# Patient Record
Sex: FEMALE | Race: WHITE | URBAN - METROPOLITAN AREA
[De-identification: names, ages, dates, MRNs, and addresses within clinical notes are randomized per-mention and may not be internally consistent; named-entity substitution may affect disease eponyms.]

---

## 2017-09-06 ENCOUNTER — TRANSFERRED RECORDS (OUTPATIENT)
Dept: HEALTH INFORMATION MANAGEMENT | Facility: CLINIC | Age: 27
End: 2017-09-06

## 2017-09-19 ENCOUNTER — TRANSFERRED RECORDS (OUTPATIENT)
Dept: HEALTH INFORMATION MANAGEMENT | Facility: CLINIC | Age: 27
End: 2017-09-19

## 2017-09-20 ENCOUNTER — ANESTHESIA EVENT (OUTPATIENT)
Dept: SURGERY | Facility: CLINIC | Age: 27
End: 2017-09-20
Payer: COMMERCIAL

## 2017-09-20 ENCOUNTER — ANESTHESIA (OUTPATIENT)
Dept: SURGERY | Facility: CLINIC | Age: 27
End: 2017-09-20
Payer: COMMERCIAL

## 2017-09-20 ENCOUNTER — APPOINTMENT (OUTPATIENT)
Dept: INTERVENTIONAL RADIOLOGY/VASCULAR | Facility: CLINIC | Age: 27
End: 2017-09-20
Attending: EMERGENCY MEDICINE
Payer: COMMERCIAL

## 2017-09-20 ENCOUNTER — HOSPITAL ENCOUNTER (INPATIENT)
Facility: CLINIC | Age: 27
LOS: 2 days | Discharge: HOME | End: 2017-09-23
Attending: EMERGENCY MEDICINE | Admitting: OBSTETRICS & GYNECOLOGY
Payer: COMMERCIAL

## 2017-09-20 DIAGNOSIS — N99.820 POSTOPERATIVE HEMORRHAGE OF VAGINA FOLLOWING GENITOURINARY PROCEDURE: ICD-10-CM

## 2017-09-20 PROBLEM — Z98.890 STATUS POST EMBOLIZATION OF UTERINE ARTERY: Status: ACTIVE | Noted: 2017-09-20

## 2017-09-20 LAB
ABO + RH BLD: ABNORMAL
ABO + RH BLD: ABNORMAL
ALBUMIN SERPL-MCNC: 2 G/DL (ref 3.4–5)
ALP SERPL-CCNC: 29 U/L (ref 40–150)
ALT SERPL W P-5'-P-CCNC: 11 U/L (ref 0–50)
ANION GAP SERPL CALCULATED.3IONS-SCNC: 11 MMOL/L (ref 3–14)
APTT PPP: 27 SEC (ref 22–37)
APTT PPP: 30 SEC (ref 22–37)
AST SERPL W P-5'-P-CCNC: 12 U/L (ref 0–45)
BASOPHILS # BLD AUTO: 0 10E9/L (ref 0–0.2)
BASOPHILS NFR BLD AUTO: 0.1 %
BILIRUB SERPL-MCNC: 0.3 MG/DL (ref 0.2–1.3)
BLD GP AB INVEST PLASRBC-IMP: ABNORMAL
BLD GP AB SCN SERPL QL: ABNORMAL
BLD PROD TYP BPU: ABNORMAL
BLD PROD TYP BPU: NORMAL
BLD UNIT ID BPU: 0
BLOOD BANK CMNT PATIENT-IMP: ABNORMAL
BLOOD BANK CMNT PATIENT-IMP: ABNORMAL
BLOOD BANK CMNT PATIENT-IMP: NORMAL
BLOOD PRODUCT CODE: NORMAL
BPU ID: NORMAL
BUN SERPL-MCNC: 4 MG/DL (ref 7–30)
CALCIUM SERPL-MCNC: 6 MG/DL (ref 8.5–10.1)
CHLORIDE SERPL-SCNC: 119 MMOL/L (ref 94–109)
CO2 SERPL-SCNC: 15 MMOL/L (ref 20–32)
CREAT SERPL-MCNC: 0.37 MG/DL (ref 0.52–1.04)
DIFFERENTIAL METHOD BLD: ABNORMAL
EOSINOPHIL # BLD AUTO: 0 10E9/L (ref 0–0.7)
EOSINOPHIL NFR BLD AUTO: 0.1 %
ERYTHROCYTE [DISTWIDTH] IN BLOOD BY AUTOMATED COUNT: 12.9 % (ref 10–15)
ERYTHROCYTE [DISTWIDTH] IN BLOOD BY AUTOMATED COUNT: 13 % (ref 10–15)
ERYTHROCYTE [DISTWIDTH] IN BLOOD BY AUTOMATED COUNT: 13.5 % (ref 10–15)
ERYTHROCYTE [DISTWIDTH] IN BLOOD BY AUTOMATED COUNT: 13.8 % (ref 10–15)
FIBRINOGEN PPP-MCNC: 156 MG/DL (ref 200–420)
GFR SERPL CREATININE-BSD FRML MDRD: >90 ML/MIN/1.7M2
GLUCOSE SERPL-MCNC: 133 MG/DL (ref 70–99)
HCT VFR BLD AUTO: 17.3 % (ref 35–47)
HCT VFR BLD AUTO: 22.5 % (ref 35–47)
HCT VFR BLD AUTO: 24.1 % (ref 35–47)
HCT VFR BLD AUTO: 26.6 % (ref 35–47)
HGB BLD-MCNC: 5.8 G/DL (ref 11.7–15.7)
HGB BLD-MCNC: 7.7 G/DL (ref 11.7–15.7)
HGB BLD-MCNC: 8 G/DL (ref 11.7–15.7)
HGB BLD-MCNC: 9.2 G/DL (ref 11.7–15.7)
IMM GRANULOCYTES # BLD: 0.1 10E9/L (ref 0–0.4)
IMM GRANULOCYTES NFR BLD: 0.4 %
INR PPP: 1.08 (ref 0.86–1.14)
INR PPP: 1.18 (ref 0.86–1.14)
INR PPP: 1.44 (ref 0.86–1.14)
LYMPHOCYTES # BLD AUTO: 1.1 10E9/L (ref 0.8–5.3)
LYMPHOCYTES NFR BLD AUTO: 6.6 %
MCH RBC QN AUTO: 30.5 PG (ref 26.5–33)
MCH RBC QN AUTO: 30.5 PG (ref 26.5–33)
MCH RBC QN AUTO: 31 PG (ref 26.5–33)
MCH RBC QN AUTO: 31.3 PG (ref 26.5–33)
MCHC RBC AUTO-ENTMCNC: 33.2 G/DL (ref 31.5–36.5)
MCHC RBC AUTO-ENTMCNC: 33.5 G/DL (ref 31.5–36.5)
MCHC RBC AUTO-ENTMCNC: 34.2 G/DL (ref 31.5–36.5)
MCHC RBC AUTO-ENTMCNC: 34.6 G/DL (ref 31.5–36.5)
MCV RBC AUTO: 91 FL (ref 78–100)
MCV RBC AUTO: 92 FL (ref 78–100)
MONOCYTES # BLD AUTO: 0.9 10E9/L (ref 0–1.3)
MONOCYTES NFR BLD AUTO: 5.2 %
NEUTROPHILS # BLD AUTO: 14.6 10E9/L (ref 1.6–8.3)
NEUTROPHILS NFR BLD AUTO: 87.6 %
NRBC # BLD AUTO: 0 10*3/UL
NRBC BLD AUTO-RTO: 0 /100
NUM BPU REQUESTED: 4
PLATELET # BLD AUTO: 144 10E9/L (ref 150–450)
PLATELET # BLD AUTO: 160 10E9/L (ref 150–450)
PLATELET # BLD AUTO: 187 10E9/L (ref 150–450)
PLATELET # BLD AUTO: 201 10E9/L (ref 150–450)
POTASSIUM SERPL-SCNC: 3.8 MMOL/L (ref 3.4–5.3)
PROT SERPL-MCNC: 3.7 G/DL (ref 6.8–8.8)
RBC # BLD AUTO: 1.9 10E12/L (ref 3.8–5.2)
RBC # BLD AUTO: 2.48 10E12/L (ref 3.8–5.2)
RBC # BLD AUTO: 2.62 10E12/L (ref 3.8–5.2)
RBC # BLD AUTO: 2.94 10E12/L (ref 3.8–5.2)
SODIUM SERPL-SCNC: 145 MMOL/L (ref 133–144)
SPECIMEN EXP DATE BLD: ABNORMAL
TRANSFUSION STATUS PATIENT QL: NORMAL
WBC # BLD AUTO: 12.9 10E9/L (ref 4–11)
WBC # BLD AUTO: 13.3 10E9/L (ref 4–11)
WBC # BLD AUTO: 14.4 10E9/L (ref 4–11)
WBC # BLD AUTO: 16.6 10E9/L (ref 4–11)

## 2017-09-20 PROCEDURE — 85610 PROTHROMBIN TIME: CPT | Performed by: OBSTETRICS & GYNECOLOGY

## 2017-09-20 PROCEDURE — 25000128 H RX IP 250 OP 636: Performed by: RADIOLOGY

## 2017-09-20 PROCEDURE — 75736 ARTERY X-RAYS PELVIS: CPT

## 2017-09-20 PROCEDURE — 85461 HEMOGLOBIN FETAL: CPT | Performed by: OBSTETRICS & GYNECOLOGY

## 2017-09-20 PROCEDURE — C1769 GUIDE WIRE: HCPCS

## 2017-09-20 PROCEDURE — 37000008 ZZH ANESTHESIA TECHNICAL FEE, 1ST 30 MIN

## 2017-09-20 PROCEDURE — C1887 CATHETER, GUIDING: HCPCS

## 2017-09-20 PROCEDURE — 36415 COLL VENOUS BLD VENIPUNCTURE: CPT | Performed by: OBSTETRICS & GYNECOLOGY

## 2017-09-20 PROCEDURE — 40000344 ZZHCL STATISTIC THAWING COMPONENT: Performed by: EMERGENCY MEDICINE

## 2017-09-20 PROCEDURE — 25000128 H RX IP 250 OP 636: Performed by: EMERGENCY MEDICINE

## 2017-09-20 PROCEDURE — C1760 CLOSURE DEV, VASC: HCPCS

## 2017-09-20 PROCEDURE — 25000128 H RX IP 250 OP 636: Performed by: ANESTHESIOLOGY

## 2017-09-20 PROCEDURE — 99285 EMERGENCY DEPT VISIT HI MDM: CPT | Mod: 25 | Performed by: EMERGENCY MEDICINE

## 2017-09-20 PROCEDURE — 25000125 ZZHC RX 250: Performed by: NURSE ANESTHETIST, CERTIFIED REGISTERED

## 2017-09-20 PROCEDURE — 85730 THROMBOPLASTIN TIME PARTIAL: CPT | Performed by: OBSTETRICS & GYNECOLOGY

## 2017-09-20 PROCEDURE — 36247 INS CATH ABD/L-EXT ART 3RD: CPT

## 2017-09-20 PROCEDURE — 85027 COMPLETE CBC AUTOMATED: CPT | Performed by: OBSTETRICS & GYNECOLOGY

## 2017-09-20 PROCEDURE — 85025 COMPLETE CBC W/AUTO DIFF WBC: CPT | Performed by: EMERGENCY MEDICINE

## 2017-09-20 PROCEDURE — 85610 PROTHROMBIN TIME: CPT | Performed by: EMERGENCY MEDICINE

## 2017-09-20 PROCEDURE — 86900 BLOOD TYPING SEROLOGIC ABO: CPT | Performed by: OBSTETRICS & GYNECOLOGY

## 2017-09-20 PROCEDURE — 86900 BLOOD TYPING SEROLOGIC ABO: CPT | Performed by: EMERGENCY MEDICINE

## 2017-09-20 PROCEDURE — 71000014 ZZH RECOVERY PHASE 1 LEVEL 2 FIRST HR

## 2017-09-20 PROCEDURE — 86923 COMPATIBILITY TEST ELECTRIC: CPT | Performed by: EMERGENCY MEDICINE

## 2017-09-20 PROCEDURE — 04LF3DU OCCLUSION OF LEFT UTERINE ARTERY WITH INTRALUMINAL DEVICE, PERCUTANEOUS APPROACH: ICD-10-PCS | Performed by: RADIOLOGY

## 2017-09-20 PROCEDURE — 25000128 H RX IP 250 OP 636: Performed by: NURSE ANESTHETIST, CERTIFIED REGISTERED

## 2017-09-20 PROCEDURE — G0378 HOSPITAL OBSERVATION PER HR: HCPCS

## 2017-09-20 PROCEDURE — 25000565 ZZH ISOFLURANE, EA 15 MIN

## 2017-09-20 PROCEDURE — 25000125 ZZHC RX 250: Performed by: RADIOLOGY

## 2017-09-20 PROCEDURE — 96374 THER/PROPH/DIAG INJ IV PUSH: CPT | Performed by: EMERGENCY MEDICINE

## 2017-09-20 PROCEDURE — P9016 RBC LEUKOCYTES REDUCED: HCPCS | Performed by: EMERGENCY MEDICINE

## 2017-09-20 PROCEDURE — 86901 BLOOD TYPING SEROLOGIC RH(D): CPT | Performed by: EMERGENCY MEDICINE

## 2017-09-20 PROCEDURE — 37000009 ZZH ANESTHESIA TECHNICAL FEE, EACH ADDTL 15 MIN

## 2017-09-20 PROCEDURE — 25000128 H RX IP 250 OP 636: Performed by: OBSTETRICS & GYNECOLOGY

## 2017-09-20 PROCEDURE — 27210902 ZZH KIT CR4

## 2017-09-20 PROCEDURE — 86870 RBC ANTIBODY IDENTIFICATION: CPT | Performed by: EMERGENCY MEDICINE

## 2017-09-20 PROCEDURE — 85384 FIBRINOGEN ACTIVITY: CPT | Performed by: OBSTETRICS & GYNECOLOGY

## 2017-09-20 PROCEDURE — P9059 PLASMA, FRZ BETWEEN 8-24HOUR: HCPCS | Performed by: EMERGENCY MEDICINE

## 2017-09-20 PROCEDURE — 25000132 ZZH RX MED GY IP 250 OP 250 PS 637: Performed by: STUDENT IN AN ORGANIZED HEALTH CARE EDUCATION/TRAINING PROGRAM

## 2017-09-20 PROCEDURE — 85027 COMPLETE CBC AUTOMATED: CPT | Performed by: EMERGENCY MEDICINE

## 2017-09-20 PROCEDURE — 37244 VASC EMBOLIZE/OCCLUDE BLEED: CPT

## 2017-09-20 PROCEDURE — 27810149 ZZH COIL/EMBOLIC DEVICE CR12

## 2017-09-20 PROCEDURE — 99285 EMERGENCY DEPT VISIT HI MDM: CPT | Mod: Z6 | Performed by: EMERGENCY MEDICINE

## 2017-09-20 PROCEDURE — 80053 COMPREHEN METABOLIC PANEL: CPT | Performed by: OBSTETRICS & GYNECOLOGY

## 2017-09-20 PROCEDURE — 86850 RBC ANTIBODY SCREEN: CPT | Performed by: EMERGENCY MEDICINE

## 2017-09-20 PROCEDURE — P9041 ALBUMIN (HUMAN),5%, 50ML: HCPCS | Performed by: NURSE ANESTHETIST, CERTIFIED REGISTERED

## 2017-09-20 PROCEDURE — 71000015 ZZH RECOVERY PHASE 1 LEVEL 2 EA ADDTL HR

## 2017-09-20 PROCEDURE — 86901 BLOOD TYPING SEROLOGIC RH(D): CPT | Performed by: OBSTETRICS & GYNECOLOGY

## 2017-09-20 PROCEDURE — 27211034

## 2017-09-20 RX ORDER — HYDROMORPHONE HYDROCHLORIDE 1 MG/ML
.3-.5 INJECTION, SOLUTION INTRAMUSCULAR; INTRAVENOUS; SUBCUTANEOUS EVERY 5 MIN PRN
Status: DISCONTINUED | OUTPATIENT
Start: 2017-09-20 | End: 2017-09-20 | Stop reason: CLARIF

## 2017-09-20 RX ORDER — LIDOCAINE 40 MG/G
CREAM TOPICAL
Status: DISCONTINUED | OUTPATIENT
Start: 2017-09-20 | End: 2017-09-20

## 2017-09-20 RX ORDER — DIPHENHYDRAMINE HYDROCHLORIDE 50 MG/ML
25 INJECTION INTRAMUSCULAR; INTRAVENOUS EVERY 6 HOURS PRN
Status: DISCONTINUED | OUTPATIENT
Start: 2017-09-20 | End: 2017-09-23 | Stop reason: HOSPADM

## 2017-09-20 RX ORDER — SODIUM CHLORIDE 9 MG/ML
INJECTION, SOLUTION INTRAVENOUS CONTINUOUS PRN
Status: DISCONTINUED | OUTPATIENT
Start: 2017-09-20 | End: 2017-09-20

## 2017-09-20 RX ORDER — ONDANSETRON 4 MG/1
4 TABLET, ORALLY DISINTEGRATING ORAL EVERY 6 HOURS PRN
Status: DISCONTINUED | OUTPATIENT
Start: 2017-09-20 | End: 2017-09-23 | Stop reason: HOSPADM

## 2017-09-20 RX ORDER — ONDANSETRON 4 MG/1
4 TABLET, ORALLY DISINTEGRATING ORAL EVERY 30 MIN PRN
Status: DISCONTINUED | OUTPATIENT
Start: 2017-09-20 | End: 2017-09-22

## 2017-09-20 RX ORDER — NITROGLYCERIN 5 MG/ML
100-1000 VIAL (ML) INTRAVENOUS ONCE
Status: COMPLETED | OUTPATIENT
Start: 2017-09-20 | End: 2017-09-20

## 2017-09-20 RX ORDER — SODIUM CHLORIDE 9 MG/ML
INJECTION, SOLUTION INTRAVENOUS CONTINUOUS
Status: DISCONTINUED | OUTPATIENT
Start: 2017-09-20 | End: 2017-09-20

## 2017-09-20 RX ORDER — EPHEDRINE SULFATE 50 MG/ML
INJECTION, SOLUTION INTRAMUSCULAR; INTRAVENOUS; SUBCUTANEOUS PRN
Status: DISCONTINUED | OUTPATIENT
Start: 2017-09-20 | End: 2017-09-20

## 2017-09-20 RX ORDER — LIDOCAINE HYDROCHLORIDE 20 MG/ML
INJECTION, SOLUTION INFILTRATION; PERINEURAL PRN
Status: DISCONTINUED | OUTPATIENT
Start: 2017-09-20 | End: 2017-09-20

## 2017-09-20 RX ORDER — LIDOCAINE HYDROCHLORIDE 10 MG/ML
1-30 INJECTION, SOLUTION EPIDURAL; INFILTRATION; INTRACAUDAL; PERINEURAL
Status: COMPLETED | OUTPATIENT
Start: 2017-09-20 | End: 2017-09-20

## 2017-09-20 RX ORDER — HYDROCODONE BITARTRATE AND ACETAMINOPHEN 5; 325 MG/1; MG/1
1-2 TABLET ORAL EVERY 6 HOURS PRN
Status: DISCONTINUED | OUTPATIENT
Start: 2017-09-20 | End: 2017-09-21

## 2017-09-20 RX ORDER — PROPOFOL 10 MG/ML
INJECTION, EMULSION INTRAVENOUS PRN
Status: DISCONTINUED | OUTPATIENT
Start: 2017-09-20 | End: 2017-09-20

## 2017-09-20 RX ORDER — POLYETHYLENE GLYCOL 3350 17 G/17G
17 POWDER, FOR SOLUTION ORAL DAILY
Status: DISCONTINUED | OUTPATIENT
Start: 2017-09-20 | End: 2017-09-20

## 2017-09-20 RX ORDER — CALCIUM CARBONATE 500 MG/1
1000 TABLET, CHEWABLE ORAL 3 TIMES DAILY PRN
Status: DISCONTINUED | OUTPATIENT
Start: 2017-09-20 | End: 2017-09-20

## 2017-09-20 RX ORDER — ONDANSETRON 2 MG/ML
4 INJECTION INTRAMUSCULAR; INTRAVENOUS EVERY 30 MIN PRN
Status: DISCONTINUED | OUTPATIENT
Start: 2017-09-20 | End: 2017-09-22

## 2017-09-20 RX ORDER — CEFAZOLIN SODIUM 2 G/100ML
2 INJECTION, SOLUTION INTRAVENOUS
Status: DISCONTINUED | OUTPATIENT
Start: 2017-09-20 | End: 2017-09-20

## 2017-09-20 RX ORDER — FENTANYL CITRATE 50 UG/ML
INJECTION, SOLUTION INTRAMUSCULAR; INTRAVENOUS PRN
Status: DISCONTINUED | OUTPATIENT
Start: 2017-09-20 | End: 2017-09-20

## 2017-09-20 RX ORDER — DEXAMETHASONE SODIUM PHOSPHATE 4 MG/ML
INJECTION, SOLUTION INTRA-ARTICULAR; INTRALESIONAL; INTRAMUSCULAR; INTRAVENOUS; SOFT TISSUE PRN
Status: DISCONTINUED | OUTPATIENT
Start: 2017-09-20 | End: 2017-09-20

## 2017-09-20 RX ORDER — ONDANSETRON 2 MG/ML
INJECTION INTRAMUSCULAR; INTRAVENOUS PRN
Status: DISCONTINUED | OUTPATIENT
Start: 2017-09-20 | End: 2017-09-20

## 2017-09-20 RX ORDER — ACETAMINOPHEN 325 MG/1
650 TABLET ORAL EVERY 4 HOURS PRN
Status: DISCONTINUED | OUTPATIENT
Start: 2017-09-20 | End: 2017-09-22

## 2017-09-20 RX ORDER — ALBUMIN, HUMAN INJ 5% 5 %
SOLUTION INTRAVENOUS CONTINUOUS PRN
Status: DISCONTINUED | OUTPATIENT
Start: 2017-09-20 | End: 2017-09-20

## 2017-09-20 RX ORDER — FENTANYL CITRATE 50 UG/ML
25-50 INJECTION, SOLUTION INTRAMUSCULAR; INTRAVENOUS
Status: DISCONTINUED | OUTPATIENT
Start: 2017-09-20 | End: 2017-09-20 | Stop reason: CLARIF

## 2017-09-20 RX ORDER — NALOXONE HYDROCHLORIDE 0.4 MG/ML
.1-.4 INJECTION, SOLUTION INTRAMUSCULAR; INTRAVENOUS; SUBCUTANEOUS
Status: DISCONTINUED | OUTPATIENT
Start: 2017-09-20 | End: 2017-09-23 | Stop reason: HOSPADM

## 2017-09-20 RX ORDER — IBUPROFEN 600 MG/1
600 TABLET, FILM COATED ORAL EVERY 6 HOURS PRN
Status: DISCONTINUED | OUTPATIENT
Start: 2017-09-20 | End: 2017-09-23 | Stop reason: HOSPADM

## 2017-09-20 RX ORDER — DIPHENHYDRAMINE HCL 25 MG
25 CAPSULE ORAL EVERY 6 HOURS PRN
Status: DISCONTINUED | OUTPATIENT
Start: 2017-09-20 | End: 2017-09-23 | Stop reason: HOSPADM

## 2017-09-20 RX ORDER — FENTANYL CITRATE 50 UG/ML
50-100 INJECTION, SOLUTION INTRAMUSCULAR; INTRAVENOUS
Status: DISCONTINUED | OUTPATIENT
Start: 2017-09-20 | End: 2017-09-20

## 2017-09-20 RX ORDER — CEFAZOLIN SODIUM 1 G/3ML
INJECTION, POWDER, FOR SOLUTION INTRAMUSCULAR; INTRAVENOUS PRN
Status: DISCONTINUED | OUTPATIENT
Start: 2017-09-20 | End: 2017-09-20

## 2017-09-20 RX ORDER — SODIUM CHLORIDE, SODIUM LACTATE, POTASSIUM CHLORIDE, CALCIUM CHLORIDE 600; 310; 30; 20 MG/100ML; MG/100ML; MG/100ML; MG/100ML
INJECTION, SOLUTION INTRAVENOUS CONTINUOUS
Status: DISCONTINUED | OUTPATIENT
Start: 2017-09-20 | End: 2017-09-23 | Stop reason: HOSPADM

## 2017-09-20 RX ORDER — ONDANSETRON 4 MG/1
4 TABLET, FILM COATED ORAL EVERY 6 HOURS PRN
Status: DISCONTINUED | OUTPATIENT
Start: 2017-09-20 | End: 2017-09-20

## 2017-09-20 RX ORDER — SODIUM CHLORIDE, SODIUM LACTATE, POTASSIUM CHLORIDE, CALCIUM CHLORIDE 600; 310; 30; 20 MG/100ML; MG/100ML; MG/100ML; MG/100ML
INJECTION, SOLUTION INTRAVENOUS CONTINUOUS PRN
Status: DISCONTINUED | OUTPATIENT
Start: 2017-09-20 | End: 2017-09-20

## 2017-09-20 RX ORDER — IODIXANOL 320 MG/ML
1-150 INJECTION, SOLUTION INTRAVASCULAR ONCE
Status: COMPLETED | OUTPATIENT
Start: 2017-09-20 | End: 2017-09-20

## 2017-09-20 RX ADMIN — HYDROMORPHONE HYDROCHLORIDE 0.5 MG: 1 INJECTION, SOLUTION INTRAMUSCULAR; INTRAVENOUS; SUBCUTANEOUS at 22:04

## 2017-09-20 RX ADMIN — PHENYLEPHRINE HYDROCHLORIDE 0.4 MCG/KG/MIN: 10 INJECTION, SOLUTION INTRAMUSCULAR; INTRAVENOUS; SUBCUTANEOUS at 17:04

## 2017-09-20 RX ADMIN — SODIUM CHLORIDE 1000 ML: 9 INJECTION, SOLUTION INTRAVENOUS at 15:22

## 2017-09-20 RX ADMIN — PHENYLEPHRINE HYDROCHLORIDE 50 MCG: 10 INJECTION, SOLUTION INTRAMUSCULAR; INTRAVENOUS; SUBCUTANEOUS at 19:13

## 2017-09-20 RX ADMIN — PROPOFOL 100 MG: 10 INJECTION, EMULSION INTRAVENOUS at 16:22

## 2017-09-20 RX ADMIN — Medication 40 MG: at 16:35

## 2017-09-20 RX ADMIN — Medication 10 MG: at 16:22

## 2017-09-20 RX ADMIN — FENTANYL CITRATE 25 MCG: 50 INJECTION, SOLUTION INTRAMUSCULAR; INTRAVENOUS at 21:16

## 2017-09-20 RX ADMIN — HYDROMORPHONE HYDROCHLORIDE: 10 INJECTION, SOLUTION INTRAMUSCULAR; INTRAVENOUS; SUBCUTANEOUS at 20:58

## 2017-09-20 RX ADMIN — Medication 5 MG: at 18:30

## 2017-09-20 RX ADMIN — PHENYLEPHRINE HYDROCHLORIDE: 10 INJECTION, SOLUTION INTRAMUSCULAR; INTRAVENOUS; SUBCUTANEOUS at 18:06

## 2017-09-20 RX ADMIN — PROPOFOL 30 MG: 10 INJECTION, EMULSION INTRAVENOUS at 16:34

## 2017-09-20 RX ADMIN — IODIXANOL 82 ML: 320 INJECTION, SOLUTION INTRAVASCULAR at 19:33

## 2017-09-20 RX ADMIN — PHENYLEPHRINE HYDROCHLORIDE 0.2 MCG/KG/MIN: 10 INJECTION, SOLUTION INTRAMUSCULAR; INTRAVENOUS; SUBCUTANEOUS at 16:38

## 2017-09-20 RX ADMIN — PHENYLEPHRINE HYDROCHLORIDE 100 MCG: 10 INJECTION, SOLUTION INTRAMUSCULAR; INTRAVENOUS; SUBCUTANEOUS at 16:42

## 2017-09-20 RX ADMIN — PHENYLEPHRINE HYDROCHLORIDE 50 MCG: 10 INJECTION, SOLUTION INTRAMUSCULAR; INTRAVENOUS; SUBCUTANEOUS at 19:39

## 2017-09-20 RX ADMIN — SODIUM CHLORIDE: 9 INJECTION, SOLUTION INTRAVENOUS at 17:56

## 2017-09-20 RX ADMIN — DEXAMETHASONE SODIUM PHOSPHATE 4 MG: 4 INJECTION, SOLUTION INTRAMUSCULAR; INTRAVENOUS at 16:34

## 2017-09-20 RX ADMIN — ALBUMIN (HUMAN): 12.5 SOLUTION INTRAVENOUS at 16:56

## 2017-09-20 RX ADMIN — LIDOCAINE HYDROCHLORIDE 2 ML: 10 INJECTION, SOLUTION EPIDURAL; INFILTRATION; INTRACAUDAL; PERINEURAL at 19:33

## 2017-09-20 RX ADMIN — ALBUMIN (HUMAN): 12.5 SOLUTION INTRAVENOUS at 16:45

## 2017-09-20 RX ADMIN — FENTANYL CITRATE 50 MCG: 50 INJECTION, SOLUTION INTRAMUSCULAR; INTRAVENOUS at 20:48

## 2017-09-20 RX ADMIN — HYDROMORPHONE HYDROCHLORIDE 0.3 MG: 1 INJECTION, SOLUTION INTRAMUSCULAR; INTRAVENOUS; SUBCUTANEOUS at 21:31

## 2017-09-20 RX ADMIN — PHENYLEPHRINE HYDROCHLORIDE 50 MCG: 10 INJECTION, SOLUTION INTRAMUSCULAR; INTRAVENOUS; SUBCUTANEOUS at 17:27

## 2017-09-20 RX ADMIN — Medication 5 MG: at 17:02

## 2017-09-20 RX ADMIN — PHENYLEPHRINE HYDROCHLORIDE 100 MCG: 10 INJECTION, SOLUTION INTRAMUSCULAR; INTRAVENOUS; SUBCUTANEOUS at 16:57

## 2017-09-20 RX ADMIN — FENTANYL CITRATE 25 MCG: 50 INJECTION, SOLUTION INTRAMUSCULAR; INTRAVENOUS at 19:02

## 2017-09-20 RX ADMIN — Medication 270 MCG: at 19:34

## 2017-09-20 RX ADMIN — FENTANYL CITRATE 50 MCG: 50 INJECTION, SOLUTION INTRAMUSCULAR; INTRAVENOUS at 21:01

## 2017-09-20 RX ADMIN — FENTANYL CITRATE 50 MCG: 50 INJECTION, SOLUTION INTRAMUSCULAR; INTRAVENOUS at 15:34

## 2017-09-20 RX ADMIN — MIDAZOLAM HYDROCHLORIDE 2 MG: 1 INJECTION, SOLUTION INTRAMUSCULAR; INTRAVENOUS at 16:10

## 2017-09-20 RX ADMIN — FENTANYL CITRATE 25 MCG: 50 INJECTION, SOLUTION INTRAMUSCULAR; INTRAVENOUS at 20:27

## 2017-09-20 RX ADMIN — CEFAZOLIN 1 G: 1 INJECTION, POWDER, FOR SOLUTION INTRAMUSCULAR; INTRAVENOUS at 18:45

## 2017-09-20 RX ADMIN — FENTANYL CITRATE 50 MCG: 50 INJECTION, SOLUTION INTRAMUSCULAR; INTRAVENOUS at 20:35

## 2017-09-20 RX ADMIN — PHENYLEPHRINE HYDROCHLORIDE 100 MCG: 10 INJECTION, SOLUTION INTRAMUSCULAR; INTRAVENOUS; SUBCUTANEOUS at 18:30

## 2017-09-20 RX ADMIN — CEFAZOLIN 2 G: 1 INJECTION, POWDER, FOR SOLUTION INTRAMUSCULAR; INTRAVENOUS at 16:45

## 2017-09-20 RX ADMIN — ACETAMINOPHEN 650 MG: 325 TABLET, FILM COATED ORAL at 21:25

## 2017-09-20 RX ADMIN — PHENYLEPHRINE HYDROCHLORIDE 200 MCG: 10 INJECTION, SOLUTION INTRAMUSCULAR; INTRAVENOUS; SUBCUTANEOUS at 16:28

## 2017-09-20 RX ADMIN — Medication 5 MG: at 17:24

## 2017-09-20 RX ADMIN — ONDANSETRON 4 MG: 2 INJECTION INTRAMUSCULAR; INTRAVENOUS at 19:29

## 2017-09-20 RX ADMIN — FENTANYL CITRATE 100 MCG: 50 INJECTION, SOLUTION INTRAMUSCULAR; INTRAVENOUS at 16:22

## 2017-09-20 RX ADMIN — PROPOFOL 30 MG: 10 INJECTION, EMULSION INTRAVENOUS at 19:00

## 2017-09-20 RX ADMIN — SODIUM CHLORIDE, POTASSIUM CHLORIDE, SODIUM LACTATE AND CALCIUM CHLORIDE: 600; 310; 30; 20 INJECTION, SOLUTION INTRAVENOUS at 16:10

## 2017-09-20 RX ADMIN — FENTANYL CITRATE 25 MCG: 50 INJECTION, SOLUTION INTRAMUSCULAR; INTRAVENOUS at 17:15

## 2017-09-20 RX ADMIN — LIDOCAINE HYDROCHLORIDE 60 MG: 20 INJECTION, SOLUTION INFILTRATION; PERINEURAL at 16:22

## 2017-09-20 RX ADMIN — PHENYLEPHRINE HYDROCHLORIDE 100 MCG: 10 INJECTION, SOLUTION INTRAMUSCULAR; INTRAVENOUS; SUBCUTANEOUS at 17:23

## 2017-09-20 RX ADMIN — PHENYLEPHRINE HYDROCHLORIDE 100 MCG: 10 INJECTION, SOLUTION INTRAMUSCULAR; INTRAVENOUS; SUBCUTANEOUS at 18:45

## 2017-09-20 RX ADMIN — Medication 80 MG: at 16:22

## 2017-09-20 ASSESSMENT — ENCOUNTER SYMPTOMS
COLOR CHANGE: 0
ABDOMINAL PAIN: 1
DIFFICULTY URINATING: 0
EYE REDNESS: 0
FEVER: 0
SHORTNESS OF BREATH: 0
ARTHRALGIAS: 0
CONFUSION: 0
HEADACHES: 0
NECK STIFFNESS: 0

## 2017-09-20 NOTE — H&P
"[facility] History and Physical    Miranda Anderson MRN# [id number]   Age: 27 year old YOB: 1990     Date of Admission: 9/20/2017    Patient Summary:  Miranda Anderson is a 27 year old  who presents from [facility] immediately s/p completed D&E at 21w5d for Trisomy 13, procedure was complicated by a high cervicouterine laceration and hemorrhage unable to be completely stabilized in the clinical setting. EBL at time of surgery was 750ml. She received vasopressin, methergine, and Cytotec prior to transfer and was stable for transfer. She arrived with a balloon inflating in the lower uterine segment and vaginal packing in place, no apparent active bleeding on arrival. On arrival, concern for pelvic pain. Denies F/S/C, N/V, lightheadedness, other systemic complaints.     ROS: Negative other than above    PMH: anxiety     PSHx: none     Medications: Sertraline     Allergies:    No Known Allergies    Social History: , denies substance abuse    Physical Exam:   Vitals:    09/20/17 1515 09/20/17 1530 09/20/17 1545   BP: 107/60 114/62    Pulse:  82    Resp:  11 15   Temp:  98.9  F (37.2  C)    TempSrc:  Oral    SpO2:  100% 99%   Weight:  55.3 kg (122 lb)    Height:  1.676 m (5' 6\")       Gen: Alert, oriented, appropriately interactive, NAD  CV: RRR,  2+ peripheral pulses  Resp: CTAB, good effort without distress   Abdomen: soft, non tender, non distended  Perineum: no lesions; normal appearing external genitalia  Vagina : packing in place, only small bleeding past packing on to the chux  Lower extremities: non-tender, no edema  Skin: no lesions or rashes    Labs:  Hgb 9.2  Plt 201  INR 1.08  Blood type B- (s/p Rhogam on 9/6)    Imaging:   BSUS with uterine balloon in place, without evidence of significant ongoing hemorrhage    A&P: Miranda Anderson is a 27 year old  JULIA POD#0 s/p D&E at 21w5d, complicated by a high cervicouterine laceration and hemorrhage, stable in arrive. IR consulted for " [place] and patient transferred directly from ID to IR for procedure. Labs and vitals stable as noted above. Type & cross for two units. Will admit for observation following procedure with evening repeat hgb. Plan deflationof balloon during procedure.      Thomas Castellanos MD, PGY-4  9/20/2017 4:26 PM     [facility] staff:  Appreciate note by Dr. Castellanos.  I have seen and examined the patient with the resident. I have reviewed, edited, and agree with the note.      Iveth Atkinson MD, FACOG  9/20/2017  4:53 PM

## 2017-09-20 NOTE — ANESTHESIA PREPROCEDURE EVALUATION
Anesthesia Evaluation     . Pt has had prior anesthetic. Type: MAC and Regional           ROS/MED HX    ENT/Pulmonary:  - neg pulmonary ROS     Neurologic:  - neg neurologic ROS     Cardiovascular:  - neg cardiovascular ROS       METS/Exercise Tolerance:  >4 METS   Hematologic:     (+) Anemia, -      Musculoskeletal:  - neg musculoskeletal ROS       GI/Hepatic:     (+) GERD Other,       Renal/Genitourinary:  - ROS Renal section negative       Endo:  - neg endo ROS       Psychiatric:  - neg psychiatric ROS       Infectious Disease:  - neg infectious disease ROS       Malignancy:         Other:                     Physical Exam  Normal systems: cardiovascular, pulmonary and dental    Airway   Mallampati: I  TM distance: >3 FB  Neck ROM: full    Dental     Cardiovascular   Rhythm and rate: regular and normal      Pulmonary    breath sounds clear to auscultation                    Anesthesia Plan      History & Physical Review  History and physical reviewed and following examination; no interval change.    ASA Status:  3 emergent.    NPO Status:  > 4 hours    Plan for General, RSI and ETT with Intravenous and Propofol induction. Maintenance will be Balanced.    PONV prophylaxis:  Ondansetron (or other 5HT-3) and Dexamethasone or Solumedrol  Additional equipment: 2nd IV Patient seen and evaluated before entering IR suite. 26 y/o F who underwent D&E today at [facility] and was transferred because of significant bleeding 2/2 high cervico-uterine laceration. The patient dropped her Hb from 13's to 9.2 and has been given IVF wide open for hemodynamic stabilization. Patient taken to IR emergently for uterine artery embolization.  Anesthetic plan discussed with the patient and her .      Postoperative Care  Postoperative pain management:  IV analgesics and Oral pain medications.      Consents  Anesthetic plan, risks, benefits and alternatives discussed with:  Patient and Spouse.  Use of blood products discussed:  Yes.   Use of blood products discussed with Patient and Spouse.  Consented to blood products.  .

## 2017-09-20 NOTE — IP AVS SNAPSHOT
[facility] [address]    [address]    [place] [place] [address]    Phone:  [phone]                                       After Visit Summary   9/20/2017    Miranda Anderson    MRN: [id number]           After Visit Summary Signature Page     I have received my discharge instructions, and my questions have been answered. I have discussed any challenges I see with this plan with the nurse or doctor.    ..........................................................................................................................................  Patient/Patient Representative Signature      ..........................................................................................................................................  Patient Representative Print Name and Relationship to Patient    ..................................................               ................................................  Date                                            Time    ..........................................................................................................................................  Reviewed by Signature/Title    ...................................................              ..............................................  Date                                                            Time

## 2017-09-20 NOTE — ED PROVIDER NOTES
"  History     Chief Complaint   Patient presents with     Post-op Problem     came from [facility], post D & C, was hemorrhaging, was packed just  enough to stop the bleeding.      HPI  Miranda Anderson is a 27 year old female who comes into the emergency room via ambulance from [facility] status post D&C with hemorrhaging felt secondary to high cervical or uterine  trauma with persistent bleeding.  I was asked by OB/GYN staff to assist with the emergency room course to include expediting nurse care and coordinating laboratory ordering and planned uterine artery embolization.  Patient apparently was given Narcan after receiving fentanyl with resultant concerns regarding her respiratory status.  Patient currently is complaining of significant lower abdominal discomfort.    I have reviewed the Medications, Allergies, Past Medical and Surgical History, and Social History in the Epic system.    Review of Systems   Constitutional: Negative for fever.   HENT: Negative for congestion.    Eyes: Negative for redness.   Respiratory: Negative for shortness of breath.    Cardiovascular: Negative for chest pain.   Gastrointestinal: Positive for abdominal pain.   Genitourinary: Negative for difficulty urinating.   Musculoskeletal: Negative for arthralgias and neck stiffness.   Skin: Negative for color change.   Neurological: Negative for headaches.   Psychiatric/Behavioral: Negative for confusion.       Physical Exam   BP: 107/60  Pulse: 82  Heart Rate: 87  Temp: 98.9  F (37.2  C)  Resp: 11  Height: 167.6 cm (5' 6\")  Weight: 55.3 kg (122 lb)  SpO2: 100 %  Physical Exam   Constitutional: She appears distressed.   HENT:   Head: Atraumatic.   Mouth/Throat: Oropharynx is clear and moist. No oropharyngeal exudate.   Eyes: Pupils are equal, round, and reactive to light. No scleral icterus.   Cardiovascular: Normal heart sounds and intact distal pulses.    Pulmonary/Chest: Breath sounds normal. No respiratory distress. "   Abdominal: Soft. Bowel sounds are normal. There is tenderness (suprapubic).   Musculoskeletal: She exhibits no edema or tenderness.   Skin: Skin is warm. No rash noted. She is not diaphoretic.       ED Course     ED Course     Procedures             Labs Ordered and Resulted from Time of ED Arrival Up to the Time of Departure from the ED   CBC WITH PLATELETS DIFFERENTIAL - Abnormal; Notable for the following:        Result Value    WBC 16.6 (*)     RBC Count 2.94 (*)     Hemoglobin 9.2 (*)     Hematocrit 26.6 (*)     Absolute Neutrophil 14.6 (*)     All other components within normal limits   ABO/RH TYPE AND SCREEN - Abnormal; Notable for the following:     Antibody Screen Pos (*)     All other components within normal limits   INR   IP ASSIGN PROVIDER TEAM TO TREATMENT TEAM   IP ASSIGN PROVIDER TEAM TO TREATMENT TEAM   OBSERVATION GOALS   ASSESS   VITAL SIGNS   ACTIVITY   BLOOD COMPONENT   BLOOD COMPONENT            Assessments & Plan (with Medical Decision Making)   27-year-old female is emergently transported to emergency room secondary to post D&C hemorrhage.  Patient was noted on exam to have moderate distress secondary to discomfort.  Laboratories were obtained revealing elevated white blood count of 16.6 consistent with stress response as well as low hemoglobin and hematocrit of 9.2 and 26.6 secondary to hemorrhage.  INR was normal at 1.08.  Patient was given IV bolus of fluid in the emergency room and fentanyl was ordered for discomfort.  The case was discussed at length with OB/GYN who is down to see the patient, please see separate note as well as interventional radiology staff.  Patient will go directly to interventional radiology and then will be admitted to OB/GYN for observation.    I have reviewed the nursing notes.    I have reviewed the findings, diagnosis, plan and need for follow up with the patient.    New Prescriptions    No medications on file       Final diagnoses:   Postoperative hemorrhage  of vagina following genitourinary procedure       9/20/2017   [facility], [place], EMERGENCY DEPARTMENT     Sriram Mantilla MD  09/20/17 4913

## 2017-09-20 NOTE — IP AVS SNAPSHOT
"                  MRN:[id number]                      After Visit Summary   9/20/2017    Miranda Anderson    MRN: [id number]           Thank you!     Thank you for choosing [facility] for your care. Our goal is always to provide you with excellent care. Hearing back from our patients is one way we can continue to improve our services. Please take a few minutes to complete the written survey that you may receive in the mail after you visit with us. Thank you!        Patient Information     Date Of Birth          1990        Designated Caregiver       Most Recent Value    Caregiver    Will someone help with your care after discharge? yes    Name of designated caregiver ,\" Duoug\"    Phone number of caregiver [phone]    Caregiver address same address [facility] pt      About your hospital stay     You were admitted on:  September 20, 2017 You last received care in the:  [facility]    You were discharged on:  September 23, 2017        Reason for your hospital stay       For management of bleeding after procedure                  Who to Call     For medical emergencies, please call 911.  For non-urgent questions about your medical care, please call your primary care provider or clinic, [phone]  For questions related to your surgery, please call your surgery clinic        Attending Provider     Provider Specialty    Sriram Mantilla MD Emergency Medicine    Demetrio, Iveth Trejo MD OB/Gyn    Charlotte, Katie Griffith MD OB/Gyn       Primary Care Provider Office Phone # Fax #    [facility] [phone]      After Care Instructions     Discharge Instructions       Activity as tolerated. Pelvic rest for 6 weeks (no tampons, intercourse or douching). No driving any time you have taken a narcotic pain medication.     Call the [facility] clinic at [phone] or return to the ED if you have any of the following:    - Temperature greater than 100.4F  - Pain not controlled by " "pain medications  - Uncontrolled nausea/vomiting  - Foul-smelling vaginal discharge  - Vaginal bleeding soaking 1 pad per hour for 2 hours in a row                  Follow-up Appointments     Adult [facility] Follow-up and recommended labs and tests       Post- operative appointment with Dr. Collado    Appointments on [place] and/or [facility] (with [facility] or [facility] provider or service). Call [phone] if you haven't heard regarding these appointments within 7 days of discharge.                  Pending Results     Date and Time Order Name Status Description    2017 1742 Plasma prepare order unit In process             Statement of Approval     Ordered          17 1134  I have reviewed and agree with all the recommendations and orders detailed in this document.  EFFECTIVE NOW     Approved and electronically signed by:  Katie Amato MD             Admission Information     Date & Time Provider Department Dept. Phone    2017 Katie Amato MD [facility] [phone]      Your Vitals Were     Blood Pressure Pulse Temperature Respirations Height Weight    100/62 (BP Location: Left arm) 71 98.4  F (36.9  C) (Oral) 16 1.676 m (5' 6\") 55.3 kg (122 lb)    Pulse Oximetry BMI (Body Mass Index)                98% 19.69 kg/m2          "[employer] Information     "[employer] lets you send messages to your doctor, view your test results, renew your prescriptions, schedule appointments and more. To sign up, go to [email or web address]/"[email or web address]" . Click on \"Log in\" on the left side of the screen, which will take you to the Welcome page. Then click on \"Sign up Now\" on the right side of the page.     You will be asked to enter the access code listed below, as well as some personal information. Please follow the directions to create your username and password.     Your access code is: [email or web address]  Expires: 2017 11:36 AM     Your access code will  in 90 days. If you need help or a new code, " please call your [facility] clinic or [phone].        Care EveryWhere ID     This is your Care EveryWhere ID. This could be used by other organizations to access your [facility] medical records  [id number]        Equal Access to Services     GERMAN VARMA : Hadii aad ku hadmini Alexis, waaxda luqadaha, qaybta kaalmada adeegyada, tony moore [place]. So [facility] [phone].    ATENCIÓN: Si habla español, tiene a garza disposición servicios gratuitos de asistencia lingüística. [facility] [phone].    We comply with applicable federal civil rights laws and [place] laws. We do not discriminate on the basis of race, color, national origin, age, disability sex, sexual orientation or gender identity.               Review of your medicines      START taking        Dose / Directions    ibuprofen 600 MG tablet   Commonly known as:  ADVIL/MOTRIN   Used for:  Postoperative hemorrhage of vagina following genitourinary procedure        Dose:  600 mg   Take 1 tablet (600 mg) by mouth every 6 hours as needed   Quantity:  40 tablet   Refills:  0       oxyCODONE-acetaminophen 5-325 MG per tablet   Commonly known as:  PERCOCET   Used for:  Postoperative hemorrhage of vagina following genitourinary procedure        Dose:  1-2 tablet   Take 1-2 tablets by mouth every 4 hours as needed for pain   Quantity:  25 tablet   Refills:  0       senna-docusate 8.6-50 MG per tablet   Commonly known as:  SENOKOT-S;PERICOLACE   Used for:  Postoperative hemorrhage of vagina following genitourinary procedure        Dose:  1 tablet   Take 1 tablet by mouth 2 times daily   Quantity:  60 tablet   Refills:  1            Where to get your medicines      These medications were sent to [facility] [place], [place] - [address]  [address] [address]     Phone:  [phone]     ibuprofen 600 MG tablet    senna-docusate 8.6-50 MG per tablet         Some of these will need a paper  prescription and others can be bought over the counter. Ask your nurse if you have questions.     Bring a paper prescription for each of these medications     oxyCODONE-acetaminophen 5-325 MG per tablet                Protect others around you: Learn how to safely use, store and throw away your medicines at www.disposemymeds.org.             Medication List: This is a list of all your medications and when to take them. Check marks below indicate your daily home schedule. Keep this list as a reference.      Medications           Morning Afternoon Evening Bedtime As Needed    ibuprofen 600 MG tablet   Commonly known as:  ADVIL/MOTRIN   Take 1 tablet (600 mg) by mouth every 6 hours as needed   Last time this was given:  600 mg on 9/22/2017  8:19 AM                                   oxyCODONE-acetaminophen 5-325 MG per tablet   Commonly known as:  PERCOCET   Take 1-2 tablets by mouth every 4 hours as needed for pain   Last time this was given:  Last at 10:45 am   Next Dose Due:  Next able to take at 2:30 pm if needed                                   senna-docusate 8.6-50 MG per tablet   Commonly known as:  SENOKOT-S;PERICOLACE   Take 1 tablet by mouth 2 times daily

## 2017-09-21 PROBLEM — N93.9 UTERINE HEMORRHAGE: Status: ACTIVE | Noted: 2017-09-21

## 2017-09-21 LAB
APTT PPP: 28 SEC (ref 22–37)
BLD PROD TYP BPU: NORMAL
BLD PROD TYP BPU: NORMAL
BLD UNIT ID BPU: 0
BLD UNIT ID BPU: 0
BLOOD PRODUCT CODE: NORMAL
BLOOD PRODUCT CODE: NORMAL
BPU ID: NORMAL
BPU ID: NORMAL
ERYTHROCYTE [DISTWIDTH] IN BLOOD BY AUTOMATED COUNT: 13.9 % (ref 10–15)
ERYTHROCYTE [DISTWIDTH] IN BLOOD BY AUTOMATED COUNT: 14 % (ref 10–15)
FIBRINOGEN PPP-MCNC: 238 MG/DL (ref 200–420)
HCT VFR BLD AUTO: 19.5 % (ref 35–47)
HCT VFR BLD AUTO: 21.7 % (ref 35–47)
HGB BLD-MCNC: 6.7 G/DL (ref 11.7–15.7)
HGB BLD-MCNC: 7.4 G/DL (ref 11.7–15.7)
INR PPP: 1.09 (ref 0.86–1.14)
INR PPP: 1.16 (ref 0.86–1.14)
MCH RBC QN AUTO: 30.3 PG (ref 26.5–33)
MCH RBC QN AUTO: 30.9 PG (ref 26.5–33)
MCHC RBC AUTO-ENTMCNC: 34.1 G/DL (ref 31.5–36.5)
MCHC RBC AUTO-ENTMCNC: 34.4 G/DL (ref 31.5–36.5)
MCV RBC AUTO: 89 FL (ref 78–100)
MCV RBC AUTO: 90 FL (ref 78–100)
PLATELET # BLD AUTO: 128 10E9/L (ref 150–450)
PLATELET # BLD AUTO: 135 10E9/L (ref 150–450)
RBC # BLD AUTO: 2.17 10E12/L (ref 3.8–5.2)
RBC # BLD AUTO: 2.44 10E12/L (ref 3.8–5.2)
TRANSFUSION STATUS PATIENT QL: NORMAL
WBC # BLD AUTO: 8.3 10E9/L (ref 4–11)
WBC # BLD AUTO: 9.7 10E9/L (ref 4–11)

## 2017-09-21 PROCEDURE — 36415 COLL VENOUS BLD VENIPUNCTURE: CPT | Performed by: STUDENT IN AN ORGANIZED HEALTH CARE EDUCATION/TRAINING PROGRAM

## 2017-09-21 PROCEDURE — 25000128 H RX IP 250 OP 636: Performed by: OBSTETRICS & GYNECOLOGY

## 2017-09-21 PROCEDURE — 25000128 H RX IP 250 OP 636: Performed by: ANESTHESIOLOGY

## 2017-09-21 PROCEDURE — 96361 HYDRATE IV INFUSION ADD-ON: CPT

## 2017-09-21 PROCEDURE — 25000132 ZZH RX MED GY IP 250 OP 250 PS 637: Performed by: STUDENT IN AN ORGANIZED HEALTH CARE EDUCATION/TRAINING PROGRAM

## 2017-09-21 PROCEDURE — P9016 RBC LEUKOCYTES REDUCED: HCPCS | Performed by: EMERGENCY MEDICINE

## 2017-09-21 PROCEDURE — 85027 COMPLETE CBC AUTOMATED: CPT | Performed by: OBSTETRICS & GYNECOLOGY

## 2017-09-21 PROCEDURE — 12000008 ZZH R&B INTERMEDIATE UMMC

## 2017-09-21 PROCEDURE — G0378 HOSPITAL OBSERVATION PER HR: HCPCS

## 2017-09-21 PROCEDURE — 85027 COMPLETE CBC AUTOMATED: CPT | Performed by: STUDENT IN AN ORGANIZED HEALTH CARE EDUCATION/TRAINING PROGRAM

## 2017-09-21 PROCEDURE — 25000132 ZZH RX MED GY IP 250 OP 250 PS 637: Performed by: OBSTETRICS & GYNECOLOGY

## 2017-09-21 PROCEDURE — 96372 THER/PROPH/DIAG INJ SC/IM: CPT

## 2017-09-21 PROCEDURE — 85610 PROTHROMBIN TIME: CPT | Performed by: STUDENT IN AN ORGANIZED HEALTH CARE EDUCATION/TRAINING PROGRAM

## 2017-09-21 PROCEDURE — 85384 FIBRINOGEN ACTIVITY: CPT | Performed by: OBSTETRICS & GYNECOLOGY

## 2017-09-21 PROCEDURE — 25000128 H RX IP 250 OP 636

## 2017-09-21 PROCEDURE — 36415 COLL VENOUS BLD VENIPUNCTURE: CPT | Performed by: OBSTETRICS & GYNECOLOGY

## 2017-09-21 PROCEDURE — 85610 PROTHROMBIN TIME: CPT | Performed by: OBSTETRICS & GYNECOLOGY

## 2017-09-21 PROCEDURE — 85730 THROMBOPLASTIN TIME PARTIAL: CPT | Performed by: OBSTETRICS & GYNECOLOGY

## 2017-09-21 RX ORDER — SODIUM CHLORIDE 9 MG/ML
INJECTION, SOLUTION INTRAVENOUS
Status: DISCONTINUED
Start: 2017-09-21 | End: 2017-09-22 | Stop reason: HOSPADM

## 2017-09-21 RX ORDER — HYDROCODONE BITARTRATE AND ACETAMINOPHEN 5; 325 MG/1; MG/1
1-2 TABLET ORAL EVERY 4 HOURS PRN
Status: DISCONTINUED | OUTPATIENT
Start: 2017-09-21 | End: 2017-09-22

## 2017-09-21 RX ORDER — SODIUM CHLORIDE 9 MG/ML
INJECTION, SOLUTION INTRAVENOUS
Status: COMPLETED
Start: 2017-09-21 | End: 2017-09-21

## 2017-09-21 RX ADMIN — HYDROCODONE BITARTRATE AND ACETAMINOPHEN 2 TABLET: 5; 325 TABLET ORAL at 08:41

## 2017-09-21 RX ADMIN — HUMAN RHO(D) IMMUNE GLOBULIN 300 MCG: 300 INJECTION, SOLUTION INTRAMUSCULAR at 08:35

## 2017-09-21 RX ADMIN — IBUPROFEN 600 MG: 600 TABLET ORAL at 06:20

## 2017-09-21 RX ADMIN — ACETAMINOPHEN 650 MG: 325 TABLET, FILM COATED ORAL at 22:24

## 2017-09-21 RX ADMIN — IBUPROFEN 600 MG: 600 TABLET ORAL at 22:24

## 2017-09-21 RX ADMIN — HYDROCODONE BITARTRATE AND ACETAMINOPHEN 2 TABLET: 5; 325 TABLET ORAL at 17:50

## 2017-09-21 RX ADMIN — SODIUM CHLORIDE 500 ML: 9 INJECTION, SOLUTION INTRAVENOUS at 13:22

## 2017-09-21 RX ADMIN — SODIUM CHLORIDE, POTASSIUM CHLORIDE, SODIUM LACTATE AND CALCIUM CHLORIDE: 600; 310; 30; 20 INJECTION, SOLUTION INTRAVENOUS at 08:37

## 2017-09-21 RX ADMIN — ACETAMINOPHEN 650 MG: 325 TABLET, FILM COATED ORAL at 01:26

## 2017-09-21 RX ADMIN — HYDROCODONE BITARTRATE AND ACETAMINOPHEN 2 TABLET: 5; 325 TABLET ORAL at 21:13

## 2017-09-21 RX ADMIN — HYDROCODONE BITARTRATE AND ACETAMINOPHEN 2 TABLET: 5; 325 TABLET ORAL at 13:22

## 2017-09-21 RX ADMIN — IBUPROFEN 600 MG: 600 TABLET ORAL at 00:12

## 2017-09-21 RX ADMIN — SODIUM CHLORIDE, POTASSIUM CHLORIDE, SODIUM LACTATE AND CALCIUM CHLORIDE: 600; 310; 30; 20 INJECTION, SOLUTION INTRAVENOUS at 18:37

## 2017-09-21 RX ADMIN — IBUPROFEN 600 MG: 600 TABLET ORAL at 16:23

## 2017-09-21 ASSESSMENT — PAIN DESCRIPTION - DESCRIPTORS: DESCRIPTORS: ACHING;CRAMPING

## 2017-09-21 NOTE — OP NOTE
"This is the radiology report and the operative report on this patient.    Preoperative diagnosis and relevant history: 40-year-old female status post  for trisomy 13 pregnancy who presented to the emergency department from the clinic with uncontrolled bleeding after attempts to tamponade the bleeding and administration of vasopressor medications. The patient had a lower uterine Lai catheter in place and vaginal packing. Report from the clinical service indicated that the exact point of bleeding and degree of laceration or injury to the uterine wall had not been visible on vaginal evaluation of the cervix but it appeared as if the bleed was to the left of midline. The bleeding did not appear to be completely controlled by the uterine balloon and interventional radiology assistance with uterine artery embolization was requested.    Postoperative Diagnosis:  The final diagnosis was uterine artery bleeding from the left side from a somewhat abnormal appearing dominant vessel passing from the left uterine artery origin toward the midline and anterior. The vessel had a somewhat atypical appearance for uterine artery in that it was larger than the other arteries, it had a less than usual \"corkscrew\" appearance, it had responded only partially to vasopressors administration, and it had a essentially open communication with the uterine cavity. This could represent bleed from trauma to a vascular malformation within the uterine wall or adjacent placenta.    Procedure:  Bilateral uterine artery angiography with left uterine artery glue embolization. In addition, throughout the procedure the patient received cardiovascular support with administration of packed red cells and fluids as well as colloid administration.     Anesthesia: General endotracheal.    Location: Interventional radiology    Operators: KONG Lee    The history and physical examination, and laboratory studies were confirmed as appropriate " and complete for the emergent procedure.     Informed consent was obtained from the patient with assent from her . The patient expressed the desire to have more children in the future if possible and therefore was hoping to avoid a hysterectomy or any procedure that would compromise integrity of the uterus. They were informed that bilateral embolization, which I was assuming at that time would be necessary could in a very low percentage of cases, seriously compromise uterine integrity if it was otherwise already partially compromised by the surgical procedure. They nonetheless agreed to proceed rather than going directly to hysterectomy.    Procedure details and findings: The patient was brought into the procedural room in interventional radiology and positioned supine. General endotracheal anesthesia was induced. The right femoral region was prepped and draped. The Time Out was performed and the patient, procedure and all pertinent medical information reviewed by all members of the OR team. Ultrasound indicated a normal right common femoral artery with a slightly high bifurcation into profunda femoris artery and superficial femoral artery. Under ultrasound control, after infiltrating the tract with 1% lidocaine, a puncture was made into the common femoral artery just above the bifurcation. A suitable ultrasound image of the needle entering the common femoral artery was taken and placed into the patient's permanent record. A small wire was placed into the right iliac artery followed by the 5 Citizen of [place] coaxial dilator, a larger wire, and then a 6 Citizen of [place] sheath. A Cobra 2 catheter was placed over the aortic bifurcation into the left external iliac artery. The floppy wire was placed down to the left common femoral artery. The long sidewinder-shaped catheter was brought over to the left side and formed into a large loop in the lower aorta. The tip of the catheter was brought down into the common iliac artery.  Injection showed a normal although slightly spasm narrowed external iliac artery passing into normal branches in the femoral region on the left side. The catheter was redirected into the internal iliac artery. Injection into the left internal iliac artery immediately demonstrated a bleed from a somewhat large left ovarian left uterine artery with free extravasation into the uterine cavity. The extravasation did not appear to be tamponaded by the balloon. A microcatheter was placed in proximity to the origin of the uterine artery. Injection in several projections indicated that there was at least one major area of extravasation at the tip of the artery as it passed anteriorly but possibly a small secondary area of extravasation slightly proximal to the end of the vessel. Because of the vasopressor drugs that had been administered, placing the microcatheter into the artery more than 2 cm was very difficult. Injection of contrast with the tip of the microcatheter approximately 2 cm into the left uterine artery, showed that there was no reflux back around the microcatheter into the other internal iliac artery anterior division branches. It was therefore elected to try embolizing the left uterine artery with larger Embospheres measuring 700-900 um. After the injection of approximately 2 cc of these particles, there was no evidence that the particles were causing the bleeding to decrease and it was felt that these particles would not be successful. Since there was still no reflux back around the microcatheter, it was elected to try larger particles of Gelfoam. A Gelfoam slurry with large 1-2 millimeter size particles was prepared and approximately 5 or 6 of these particles injected into the artery. These particles also passed through the point of bleeding into the uterine cavity and it was felt that this also would not be successful.    Consultation was then obtained with the [facility] embolization expert,  Dr. Gutierrez, who agreed that glue embolization of the artery might be the only alternative. [place] was considered but considered to be technically more difficult to ensure that it went all the way to the bleeding point. In addition, glue or some other liquid embolic agent appeared to be the only alternative to resorting to hysterectomy given the size of the opening from the vessel into the uterine cavity. Attempts to bring the sidewinder-shaped catheter down into the origin of the uterine artery so that the catheter could be quickly removed after glue administration were unsuccessful and access to the left uterine artery was lost. The sidewinder catheter was removed. A Cobra 2 catheter was brought over the aortic bifurcation from the right to the left and was able to be advanced into the origin of the left uterine artery. The microcatheter was then able to be advanced slightly further into the uterine artery. 200 mcg of nitroglycerin were injected into the artery and the catheter was able to be advanced even further. After multiple manipulations and injection of another 100 mcg of nitroglycerin, the microcatheter was eventually able to be advanced past all of the side branches to the cervical region and approximately two thirds of the way to the point of leak. Injection of contrast showed that there was likely to be some reflux backwards into cervical and vaginal arteries, but that it did not appear to be significant and did not go back to the other branches of the anterior division of the left internal iliac artery. Glue was prepared in a very diluted mixture of 4 cc of ethiodol to 1 cc of glue. Under digital subtraction angiographic control, the glue was slowly injected until the entire left uterine artery had been filled and there appeared to be some spillage out of the leak site into the uterine cavity or at least into the uterine wall. At that point there was some reflux of the glue into some of the more  proximal cervical and possibly small vaginal branches but it did not appear to continue all the way to the termination of those branches. The microcatheter was removed. The base catheter tip was very slowly injected to clear it of any glue and a small amount of glue was able to be injected out of the catheter into the origin of the left uterine artery. The guiding catheter was then removed while suction was applied. The 6 [place] sheath was aspirated and flushed. A new Cobra 2 catheter was advanced over the aortic bifurcation into the left internal iliac artery anterior division. Injection of contrast showed that there was no antegrade flow through the left uterine artery and no evidence of any bleeding. All other branches appeared to have remained patent. The Cobra catheter was removed leaving a floppy wire in the left internal iliac artery. The Cobra catheter was replaced over the guidewire with the long Sidewinder catheter which was formed in the aorta and brought down with its tip in the right internal iliac artery. Injection of contrast in 3 different projections showed that the right uterine artery was normal in appearance with normal distribution and also appeared to have early collaterals forming toward the left side. At this point the bladder appeared to be distended by contrast. The procedure was briefly interrupted as a Lai catheter was placed into the bladder by the interventional radiology nurse and over 350 cc of clear urine was rapidly obtained. It was felt that no further embolization was required. This was verified clinically by the OB/GYN staff person in attendance who did a brief exam and removed the vaginal packing and the uterine balloon. Injection through the 6 [place] sheath demonstrated that the right external iliac artery was slightly narrowed around the point of entry. There was no evidence of any thrombus. It was felt that it would not be suitable for placement of an Angio-Seal device. The  6 English sheath was removed without difficulty. Compression was applied for 15 minutes with excellent hemostasis. A pressure dressing was placed over the site.    Complications: Partial embolization of some of the left-sided cervical and vaginal branches arising from the proximal left uterine artery    Specimens: None    Sedation: Medications for sedation were administered by the Anesthesiology team members in attendance. Vital signs were monitored by anesthesia throughout the procedure and remained stable. The patient was awakened and extubated in interventional radiology and transferred to the PACU in stable condition.    Estimated blood loss: 100-200 mL based on flow out of the artery that was bleeding into the uterine cavity    Fluoro time: 35.2 minutes    Conclusion and Plan: Successful embolization of a large bleed from a somewhat atypical appearing left uterine artery that may have been associated with some type of vascular malformation. Only one bleeding site was clearly demonstrated. Embolization required glue occlusion of the entire left uterine artery, some intrauterine branches, and a small amount of glue passing inadvertently into cervical and vaginal branches arising from the proximal left uterine artery. In addition to watching for any further bleeding, it was suggested that the clinical team consider the followin. Old blood is likely to be passing out of the uterus through the vagina for several days given the large size of the cavity and the fact that there was relatively free extravasation from the left uterine artery that was not controlled by the intrauterine balloon.  2. An MRI with and without contrast of the low abdomen and pelvis in 24-48 hours will be helpful to determine exactly how much ischemic damage was done by the glue embolization.  3. If clinically indicated and confirmed by imaging suggestion of ischemia involving the uterus, cervix, or upper vagina on the left, the team  might wish to consider direct visualization of the cervix, vagina, and possibly even the uterine cavity to evaluate for ischemic changes, and intrauterine abnormal vascularity.

## 2017-09-21 NOTE — PROGRESS NOTES
"SPIRITUAL HEALTH SERVICES  SPIRITUAL ASSESSMENT Progress Note  [facility] ([facility] [place] 10A  ON-CALL VISIT    PRIMARY FOCUS:     Emotional/spiritual/Gnosticism distress    Support for coping    ILLNESS CIRCUMSTANCES:   Responded to Epic consult order for emotional support. Reviewed documentation. Reflective conversation shared with pt Miranda, which integrated elements of illness and family narratives.     Context of Serious Illness/Symptom(s) - Miranda was admitted after complications from a D&E procedure at 21w5d for trisomy 13. [facility] Miranda said they learned of the diagnosis of Trisomy 13 two weeks ago. She said she does not know the baby's sex, but her  does.     Resources for Support -  Anupam, some family and friends, Miranda said she and Anupam will be seeking out counseling to help them process their grief.     DISTRESS:     Emotional/Existential/Relational Distress -   o Miranda  Is grieving for the loss of her pregnancy and baby. She was tearful throughout the visit as she processed her feelings of sadness, fear, and confusion in the wake of learning of her baby's diagnosis and since her procedure. She said it felt good to have time in our conversation that focused on the loss of her baby.   o Miranda spoke of her worry for her family, including her 3 yo daughter, wondering the best ways to communicate what has happened to her family and friends who support her and those who do not.   o Miranda said that she and her  were both afraid that Miranda might die after she began having complications and was transferred to [facility]. She talked about being scared for her  and daughter    Spiritual/Gnosticist Distress - Miranda talked about their choice to end the pregnancy saying \"we know it's not what we think God usually wants us to do but we didn't feel that we had a choice.\"she acknowledged that she has struggled a little with the decision \"morally\" but is not blaming herself or God for what " "happened.     Social/Cultural/Economic Distress - Not discussed.     SPIRITUAL/Holiness COPING:     Yazidism/Pat - Hindu (Buddhist and Presbyterian)    Spiritual Practice(s) - Prayer which we shared.     Emotional/Existential/Relational Connections -   o Miranda said she feels that \"God has been with me through this whole process.\" Affirmed her feeling of connectedness with God and God's presence in her life.   o She said that some family members (her 's parents, her aunt, some friends) have been helpful and supportive and they have told these support people about the baby's diagnosis and their decision to end the pregnancy. However she says that she has more complex relationships with some of her family who are \"less healthy.\" With these less supportive people they have decided to simply say that \"we lost the baby.\"She does not feel that these people would be able to offer her the support and comfort she needs at this time. Together we affirmed her boundary setting and seeking out the support and resources that will help her at this time.   o Her  Anupam has been a significant source of support to Miranda, encouraging her to \"feel all our emotions because if we don't we'll never heal.\" Miranda said it has been helpful to cry and feel her sadness and fear as a way of beginning to process these emotions and heal.     GOALS OF CARE:    Goals of Care - Ongoing care for her. Miranda requested a follow-up visit from [facility] later int he week.     Meaning/Sense-Making - Miranda said \"I think we are on the road to healing. I know God is with us.\"     PLAN: Contacted Sekou Adkins so he can provide fetal loss education and support resources by mail for family. Will provide Miranda with a prayer shawl, per her request, in memory of her baby. Will notify unit  of pt's request for follow-up support.     JAIRO FowlerDiv  Associate   Pager [phone]    "

## 2017-09-21 NOTE — OR NURSING
PACU to Inpatient Nursing Handoff    Patient Miranda Anderson is a 27 year old female who speaks Data Unavailable.   Procedure Procedure(s):  Uterine Artery Embolization Out of OR   Surgeon(s) Primary: GENERIC ANESTHESIA PROVIDER     No Known Allergies    Isolation  No active isolations    Past Medical History   has no past medical history on file.    Anesthesia General   Dermatome Level     Preop Meds Not applicable   Nerve block Not applicable   Intraop Meds dexamethasone (Decadron)  fentanyl (Sublimaze):  150 mcg total  ondansetron (Zofran): last given at 1929   Local Meds No   Antibiotics cefazolin (Ancef) - last given at 1845     Pain Patient Currently in Pain: yes  Comfort: tolerable with discomfort  Pain Control: partially effective   PACU meds  acetaminophen (Tylenol): 650 mg (total dose) last given at 2125   fentanyl (Sublimaze): 200 mcg (total dose) last given at 2116   hydromorphone (Dilaudid): 0.3 mg (total dose) last given at 2131    PCA / epidural Yes. PCA - hydromorphone (Dilaudid)   Capnography     Telemetry ECG Rhythm: Sinus rhythm      Labs Glucose Lab Results   Component Value Date     09/20/2017       Hgb Lab Results   Component Value Date    HGB 8.0 09/20/2017       INR Lab Results   Component Value Date    INR 1.44 09/20/2017      PACU Imaging Not applicable     Wound/Incision     CMS     Equipment Not applicable   Other LDA       IV Access Peripheral IV 09/20/17 Left (Active)   Site Assessment [facility] 9/20/2017  9:00 PM   Line Status Infusing 9/20/2017  9:00 PM   Phlebitis Scale 0-->no symptoms 9/20/2017  9:00 PM   Number of days:0       Peripheral IV 09/20/17 Right Lower forearm (Active)   Site Assessment [facility] 9/20/2017  9:00 PM   Line Status Saline locked 9/20/2017  9:00 PM   Phlebitis Scale 0-->no symptoms 9/20/2017  9:00 PM   Number of days:0      Blood Products Red Blood Cells:  2 unit(s) ordered, 2 unit(s) given  Platelets:  2 unit(s) ordered, 2 unit(s) given EBL surg log * No blood loss  amount entered *   Intake/Output Date 09/20/17 0700 - 09/21/17 0659   Shift 1499-2280 0199-9402 5500-7563 24 Hour Total   I  N  T  A  K  E   P.O.  270  270    I.V.  1400  1400    Colloid  500  500    Plasma  634  634    Blood Components  600  600    Shift Total  (mL/kg)  3404  (61.51)  3404  (61.51)   O  U  T  P  U  T   Urine  750  750    Blood  1000  1000    Shift Total  (mL/kg)  1750  (31.62)  1750  (31.62)   Weight (kg)  55.34 55.34 55.34        Drains / Lia Urethral Catheter Straight-tip 16 fr (Active)   Collection Container Standard;Patent 9/20/2017  9:00 PM   Securement Method Securing device (Describe) 9/20/2017  8:04 PM   Rationale for Continued Use /GI/GYN Pelvic Procedure 9/20/2017  8:04 PM   Urine Output 400 mL 9/20/2017  8:04 PM   Number of days:0      Time of void PreOp      PostOp     Bladder Scan      mL (09/20/17 2137)  tolerating sips     Vitals    B/P: 108/73  T: 100  F (37.8  C)    Temp src: Axillary  P:  Pulse: 82 (09/20/17 1530)    Heart Rate: 82 (09/20/17 2115)     R: 11  O2:  SpO2: 97 %    O2 Device: None (Room air)    Oxygen Delivery: 6 LPM         Family/support present yes   Patient belongings     Patient transported on cart and air mat   DC meds/scripts (obs/outpt) Not applicable     Special needs/considerations None   Tasks needing completion ordered capnography machine from [facility], has not arrived yet. Called to have lab draw pt at 2200 in pacu prior to transfer to [facility]. 10A RN to review the results when available.        Alma Tay, LATISHA  ASCOM [id number]

## 2017-09-21 NOTE — OR NURSING
Dr Flores visited, pain has been at the worst 6-7/10   Receiving fentanyl, all surgical sites are WBL, abdomen is slightly  rounded and firm  Right groin pressure dressing is dry, no hematoma nor bruizing noted into thigh,  No vaginal bleeding present.  Labs to be drawn at 2200. VSS report and transfer of care to Alma Tay RN

## 2017-09-21 NOTE — PROGRESS NOTES
Patient was evaluated and continues to feel dizzy while sitting upright in bed. She has not ambulated yet and is very anxious about doing so. Two units of blood were ordered this morning but only 1 was given, so the plan will be to give her the second unit with the goal of getting her up and ambulating this afternoon.    Thomas Castellanos   3:13 PM

## 2017-09-21 NOTE — PLAN OF CARE
Problem: Patient Care Overview  Goal: Plan of Care/Patient Progress Review  Outcome: Improving  Pt c/o  Increased abd cramping. No vaginal flow. Abd soft. BS +. Pt denies flatus.  Pt on Dilaudid PCA. Pt able to tolerate diet without nausea but appetite fair. Blood transfusion started by night RN. Infusion infused in without problems. Recheck of Hgb scheduled. Hgb 7.4. Will transfuse with second unit. Discussed with Dr. Amato pt requesting oral pain medication vs Dilaudid. Pt not using Dilaudid PCA. Dilaudid PCA dcd. Pt medicated with norco with good relief. No vaginal flow.  Call light within reach     1615 second unit of blood infused in without reaction. Temp during transfusion 100.8 (orally) rechecked temp 99(orally) Pt c/o abd pain mainly in  Left lower quadrant. Ibuprofen given. Pt will order supper and then will try to walk in valentine with nursing staff. Small amount of vaginal flow. Half of bruce pad used. Lai patent draining clear urine.    1800 Pt able to walk in valentine with standby assist of 1 and  to nurses station and back. Gait steady. Pt denies dizziness.

## 2017-09-21 NOTE — PROGRESS NOTES
Pt arrived on uint from PACU at 22:45.  Private room is not available on 10a unit and 8a unit.  Currently pt is in double room but no roommate.  Explained  SO about situation ( not able give a private room).  SO agreed to get   visit tomorrow.  Spiritual Health service consulted.

## 2017-09-21 NOTE — BRIEF OP NOTE
[facility], [place]    Brief Operative Note    Pre-operative diagnosis: Post-D and C Hemorraging   Post-operative diagnosis Anterior uterine wall arterial bleed  Procedure: Procedure(s):  Uterine Artery Embolization Out of OR  Surgeon: Surgeon(s) and Role:     * GENERIC ANESTHESIA PROVIDER - Primary  Anesthesia: General   Estimated blood loss: Less than 100 ml  Drains: None  Specimens: * No specimens in log *  Findings:   Atypical bleeding inherent to the operation that was recognized and controlled.Bleed from large hole/laceration left uterine artery failed particles. Eventually totally embolization of left uterine artery. No bleed from right.  Complications: Spill over of glue into left cervico-vaginal arteries.  Implants: None.

## 2017-09-21 NOTE — ANESTHESIA POSTPROCEDURE EVALUATION
Patient: Miranda Anderson    Procedure(s):  Uterine Artery Embolization Out of OR    Diagnosis:Post-Partum Hemorraging   Diagnosis Additional Information: No value filed.    Anesthesia Type:  No value filed.    Note:  Anesthesia Post Evaluation    Patient location during evaluation: PACU and Bedside  Patient participation: Able to fully participate in evaluation  Level of consciousness: awake and alert  Pain management: adequate  Airway patency: patent  Cardiovascular status: acceptable  Respiratory status: acceptable  Hydration status: acceptable  PONV: none     Anesthetic complications: None          Last vitals:  Vitals:    09/20/17 2045 09/20/17 2100 09/20/17 2115   BP: 112/71 113/64    Pulse:      Resp: 17 12 11   Temp:      SpO2: 98% 98%          Electronically Signed By: Cinthia Flores MD  September 20, 2017  9:17 PM

## 2017-09-21 NOTE — DISCHARGE SUMMARY
[facility] Discharge Summary    Miranda Anderson MRN# [id number]   Age: 27 year old YOB: 1990     Date of Admission:  9/20/2017  Date of Discharge::  9/23/2017  Admitting Physician:  Iveth Atkinson MD  Discharge Physician:  Miranda Arias MD          Admission Diagnoses:   Completed D&E complicated by high cervicouterine laceration and hemorrhage  Fetus with trisomy 13  Anxiety          Discharge Diagnosis:   Same  ABLA secondary to uterine blood loss  S/p uterine artery embolization           Procedures:   Uterine artery glue embolization per Interventional Radiology  Removal of vaginal packing and uterine balloon         Medications Prior to Admission:   Sertraline          Discharge Medications:        Review of your medicines      START taking       Dose / Directions    ibuprofen 600 MG tablet   Commonly known as:  ADVIL/MOTRIN   Used for:  Postoperative hemorrhage of vagina following genitourinary procedure        Dose:  600 mg   Take 1 tablet (600 mg) by mouth every 6 hours as needed   Quantity:  40 tablet   Refills:  0       oxyCODONE-acetaminophen 5-325 MG per tablet   Commonly known as:  PERCOCET   Used for:  Postoperative hemorrhage of vagina following genitourinary procedure        Dose:  1-2 tablet   Take 1-2 tablets by mouth every 4 hours as needed for pain   Quantity:  25 tablet   Refills:  0       senna-docusate 8.6-50 MG per tablet   Commonly known as:  SENOKOT-S;PERICOLACE   Used for:  Postoperative hemorrhage of vagina following genitourinary procedure        Dose:  1 tablet   Take 1 tablet by mouth 2 times daily   Quantity:  60 tablet   Refills:  1            Where to get your medicines      These medications were sent to [facility] [address]  [address] [address]     Phone:  [phone]      ibuprofen 600 MG tablet     senna-docusate 8.6-50 MG per tablet         Some of these will need a paper prescription  and others can be bought over the counter. Ask your nurse if you have questions.     Bring a paper prescription for each of these medications      oxyCODONE-acetaminophen 5-325 MG per tablet                     Consultations:   Interventional radiology for above procedure          History of Admission:     Miranda Anderson is a 27 year old  who presented from [facility] immediately s/p completed D&E at 21w5d for trisomy 13. The  procedure was complicated by a high cervicouterine laceration and hemorrhage that was unable to be completely stabilized in the clinical setting. EBL at time of surgery was 750ml. She received vasopressin, methergine, and Cytotec prior to transfer and was stable on arrival.            Hospital Course:     Patient's hgb on arrival was 9.2. She was emergently taken to the IR suite from the ED where she underwent glue UAE that was complicated by partial embolization of the left cervical and vaginal branches. EBL from procedure was 100-200 mL, a/w a hgb drop to 5.8. Patient was given 2 U PBC with an appropriate increase to 8.0. FFP was also given. Post-op there was concern for possible uterine ischemia given the partial embolization of nearby vessels and patient was monitored for increased pain and acute abdomen. One additional unit of PRBC were given 9/21/17 am for a hgb of 6.7 and symptomatic anemia without evidence of continued bleeding. Hgb bumped to 7.4 with continued dizziness while sitting upright so another unit was given in the afternoon with a subsequent rise in hgb to 8.1 with symptom resolution. MRI pelvis w/w/o contrast 9/22 per IR recommendations suggested left sided lower uterine segment perforation near the uterine artery. This was confirmed on CT, which demonstrated a 13 x 3 cm area of blood and extravasated contrast. The collection, however, was stable, as was her hemoglobin, which was 8.6 on the day of discharge with no signs of ongoing bleeding. Pt Rh negative and  received Rhogam prior to discharge.          Discharge Instructions and Follow-Up:   Discharge diet: Regular   Discharge activity: Progress as tolerated   Discharge follow-up: Patient's Ob/Gyn at [place]               Discharge Disposition:   Discharge to home      Claudia Stephanie, PGY3  OB/Gyn  9/23/2017, 11:11 AM         OB/GYN Staff -- Pt seen and examined by me. Agree with note as above.  Pt expressed happiness with her care here and was looking forward to going home. Recommend waiting 1 year until achieving pregnancy.  Plans to see PMD ofr birth control. Thierry

## 2017-09-21 NOTE — PROGRESS NOTES
Observation goals:  00  -diagnostic tests and consults completed and resulted : labs in am  -vital signs normal or at patient baseline:VSS   -tolerating oral intake to maintain hydration : no nausea or emesis  -adequate pain control on oral analgesics: cont to be on PCA for pain control  02  diagnostic tests and consults completed and resulted : labs in am  -vital signs normal or at patient baseline:VSS   -tolerating oral intake to maintain hydration : no nausea or emesis  -adequate pain control on oral analgesics: cont to be on PCA for pain control  04  diagnostic tests and consults completed and resulted : labs in am  -vital signs normal or at patient baseline:VSS   -tolerating oral intake to maintain hydration : no nausea or emesis  -adequate pain control on oral analgesics: cont to be on PCA for pain control    0614  diagnostic tests and consults completed and resulted : Hgb back at 6.7 awaiting to see if pt will receive PRBC's  -vital signs normal or at patient baseline:VSS   -tolerating oral intake to maintain hydration : no nausea or emesis  -adequate pain control on oral analgesics: cont to be on PCA for pain control

## 2017-09-21 NOTE — PROGRESS NOTES
IR f/u  Pt seen and issues discussed with her and her     Current symptoms limited to LLQ pain in area of embolization.  None of the blood in the uterus has yet been spontaneously evacuated, but the patient has remained at bedrest.  No pain at R CFA puncture site    Low Hgb being corrected with PRBCs.  Labs O/W acceptable    Limited exam showed tenderness LLQ but not the upper central  abdomen, clean right groin with no hematoma, and normal pulses R DPA and PTA    Concerns and recommendations:  1. Pain course is typical for the usual UAE patient so far and pain could persist for up to 7-10 days  2. I would expect her to spontaneously empty the uterus of blood and encouraged her to start increasing activity, especially walking, to assist in that effort. Doing anything medically to increase uterine tone or manually evacuate clot should probably be avoided during the acute phase of her recovery for at least 6-7 days.  3. I'm still concerned about the degree of ischemia that may be caused by glue and feel that an MRI (with and without contrast) of the low abdomen and pelvis is warranted as a baseline in the next day or 2. In addition, the cervix and vagina may need to be evaluated under direct vision. It might be best to accomplish this before she is sent home.     Thanks to the OB-GYN MD's for excellent notes and care.

## 2017-09-21 NOTE — PLAN OF CARE
Problem: Patient Care Overview  Goal: Plan of Care/Patient Progress Review  Outcome: Improving  Pt has been resting in bed. Reluctant to move much, due to fear that bleeding will restart. Dressing has remained c/d/i. Overnight. Pain tolerable with dilaudid PCA, tylenol, and Ibuprofen. VSS. Reports feeling very tired and has been grateful to be able to rest some tonight. No report of nausea or emesis. Able to make needs known. Cont to assess.

## 2017-09-21 NOTE — CONSULTS
Social Work: Assessment with Discharge Plan    Patient Name:  Miranda Abarca  :  1990  Age:  27 year old  MRN:  [id number]  Risk/Complexity Score:  Filed Complexity Screen Score: 4  Completed assessment with:  Pt, spouse Anupam, [facility] nursing staff    Presenting Information   Reason for Referral:  pregnancy termination due to Trisomy 13 at 21 weeks  Date of Intake:  2017  Referral Source:  Chart Review  Decision Maker:  pt  Alternate Decision Maker:  Spouse Anupam    Living Situation:  House  Previous Functional Status:  Independent  Patient and family understanding of hospitalization:  Seeking treatment from complications from termination of a pregnancy  Cultural/Language/Spiritual Considerations:  , farmers  Adjustment to Illness:  Accepting, understanding greif    Physical Health  Reason for Admission:    1. Postoperative hemorrhage of vagina following genitourinary procedure      Services Needed/Recommended:  Other:  home. may need support with  planning, etc.    Mental Health/Chemical Dependency  Diagnosis:  greif  Support/Services in Place:  NA  Services Needed/Recommended:  NA    Support System  Significant relationship at present time:  Spouse Anupam  Family of origin is available for support:  yes  Other support available:  Friends, other family  Gaps in support system:  None noted  Patient is caregiver to:  Child:  Miranda Ward, age 2     Provider Information   Primary Care Physician:  [facility]   [phone]   Clinic:  [address] [place] [id number]      :      Financial   Income Source:  Smeet  Financial Concerns:  None noted  Insurance:    Payor/Plan Subscriber Name Rel Member # Group #   BCBS - BCBS OUT OF * YENNY ABARCA  [id number]       [id number]       Discharge Plan   Patient and family discharge goal:  OR home  Provided education on discharge plan:  YES  Patient agreeable to discharge plan:  YES  Barriers to  "discharge:  Medical stability    Discharge Recommendations   Anticipated Disposition:  Home, no needs identified  Transportation Needs:  Family:  Anupam  Name of Transportation Company and Phone:  NA    Additional comments   Writer introduced role/reason for visit. Pt and Anupam were receptive. Writer provided resources about fetal loss, including other resources pt and spouse may need.  They described feeling some conflict over not supporting pregnancy termination but having to make a very difficult choice to \"Do what is right for our family.\" Both identify feeling additional stress with medical complications.     They shared grief experiences, each concerned for the other. Anupam feels additional stress with knowing season work will begin to keep him involved in work and away from home for close to 20 hours/day. They identify this as a normally stressful time in their marriage. Miranda identifies having other support during this time and having a plan to connect with other family.    They worry over what to tell friends/family and other community members. Writer assisted with them identifying how/what they want to say; normalized and reminded them they have a choice of what/how much to share.      Miranda and Anupam expressed gratitude for support of SW, verbalized understanding that SW is available per request/referral. Writer seeking further support for them to help their daughter understand/talk about loss of baby.   "

## 2017-09-21 NOTE — ANESTHESIA CARE TRANSFER NOTE
Patient: Miranda Anderson    Procedure(s):  Uterine Artery Embolization Out of OR    Diagnosis: Post-Partum Hemorraging   Diagnosis Additional Information: No value filed.    Anesthesia Type:   No value filed.     Note:  Airway :Face Mask  Patient transferred to:PACU  Comments: Transported to PACU with FM O2.  VSS.  Report given.  Patient comfortable.      Vitals: (Last set prior to Anesthesia Care Transfer)    CRNA VITALS  9/20/2017 1929 - 9/20/2017 2009 9/20/2017             Resp Rate (set): 10                Electronically Signed By: JOHN Araujo CRNA  September 20, 2017  8:09 PM

## 2017-09-21 NOTE — PROGRESS NOTES
Gynecology Progress Note     S: Pt did reasonably well overnight. Pain has been under adequate control with PCA dilaudid and she was able to get some sleep. Feels weak and tired but denies nausea, vomiting, or further vaginal bleeding.    Vitals: B/P: 99/65, T: 98.4, P: 74, R: 14    Gen: Patient appears tired but is alert, oriented, NAD  CV: Well perfused  Resp: Good effort without distress   Abdomen: Soft, fundal tenderness, non distended  Lower extremities: Non-tender, no edema  Skin: No lesions or rashes    Labs:   Hgb: 6.7 (down from 7.7 last night after 2 U PRBC)  Plt: 135 (down from 144)  INR: 1.16 (decreasing and stable)      A/P: Miranda Anderson 27 year old 1 day s/p D&E at 21w5d for trisomy 13 complicated by high cervicouterine laceration with hemorrhage requiring UAE. Her hemoglobin dropped below 7 overnight so the plan will be to order another 2 units of PRBC and obtain a CBC and INR this afternoon. Concern for uterine artery compromise during IR procedure.   -FEN: Continue maintenance IV fluids. ADAT.  -Pain: Continue with current plan for pain control. Will monitor pain closely. If not improving or worsening, consider repeat imaging with MRI. Concern for necrosis of uterine tissue which may require further surgical management.  -GI: Zofran prn  -: Continue to monitor vaginal bleeding. Improved  -Heme: See labs above. Follow-up CBC, INR this PM.  -Ppx: SCDs    Parag Lewis MD  OB/GYN PGY1  September 21, 2017    Appreciate note by Dr. Lewis. Patient has been seen and examined by me separate from the resident, agree with above note.     Katie Amato MD  3:02 PM

## 2017-09-22 ENCOUNTER — APPOINTMENT (OUTPATIENT)
Dept: CT IMAGING | Facility: CLINIC | Age: 27
End: 2017-09-22
Payer: COMMERCIAL

## 2017-09-22 ENCOUNTER — APPOINTMENT (OUTPATIENT)
Dept: MRI IMAGING | Facility: CLINIC | Age: 27
End: 2017-09-22
Payer: COMMERCIAL

## 2017-09-22 LAB
ABO + RH BLD: NORMAL
ABO + RH BLD: NORMAL
APTT PPP: 26 SEC (ref 22–37)
BLD PROD TYP BPU: NORMAL
BLD PROD TYP BPU: NORMAL
BLD UNIT ID BPU: 0
BLD UNIT ID BPU: 0
BLOOD BANK CMNT PATIENT-IMP: NORMAL
BPU ID: NORMAL
BPU ID: NORMAL
DATE RH IMM GL GVN: NORMAL
FETAL CELL SCN BLD QL ROSETTE: NORMAL
FIBRINOGEN PPP-MCNC: 343 MG/DL (ref 200–420)
HGB BLD-MCNC: 8.1 G/DL (ref 11.7–15.7)
INR PPP: 0.94 (ref 0.86–1.14)
RH IG VIALS RECOM PATIENT: NORMAL
TRANSFUSION STATUS PATIENT QL: NORMAL
TRANSFUSION STATUS PATIENT QL: NORMAL

## 2017-09-22 PROCEDURE — 25000132 ZZH RX MED GY IP 250 OP 250 PS 637: Performed by: STUDENT IN AN ORGANIZED HEALTH CARE EDUCATION/TRAINING PROGRAM

## 2017-09-22 PROCEDURE — 72197 MRI PELVIS W/O & W/DYE: CPT

## 2017-09-22 PROCEDURE — 85018 HEMOGLOBIN: CPT | Performed by: OBSTETRICS & GYNECOLOGY

## 2017-09-22 PROCEDURE — 85610 PROTHROMBIN TIME: CPT | Performed by: STUDENT IN AN ORGANIZED HEALTH CARE EDUCATION/TRAINING PROGRAM

## 2017-09-22 PROCEDURE — 25000132 ZZH RX MED GY IP 250 OP 250 PS 637: Performed by: OBSTETRICS & GYNECOLOGY

## 2017-09-22 PROCEDURE — 36415 COLL VENOUS BLD VENIPUNCTURE: CPT | Performed by: OBSTETRICS & GYNECOLOGY

## 2017-09-22 PROCEDURE — 36415 COLL VENOUS BLD VENIPUNCTURE: CPT | Performed by: STUDENT IN AN ORGANIZED HEALTH CARE EDUCATION/TRAINING PROGRAM

## 2017-09-22 PROCEDURE — A9585 GADOBUTROL INJECTION: HCPCS | Performed by: OBSTETRICS & GYNECOLOGY

## 2017-09-22 PROCEDURE — 85730 THROMBOPLASTIN TIME PARTIAL: CPT | Performed by: STUDENT IN AN ORGANIZED HEALTH CARE EDUCATION/TRAINING PROGRAM

## 2017-09-22 PROCEDURE — 25000128 H RX IP 250 OP 636: Performed by: OBSTETRICS & GYNECOLOGY

## 2017-09-22 PROCEDURE — 12000001 ZZH R&B MED SURG/OB UMMC

## 2017-09-22 PROCEDURE — 25000125 ZZHC RX 250: Performed by: OBSTETRICS & GYNECOLOGY

## 2017-09-22 PROCEDURE — 25000128 H RX IP 250 OP 636: Performed by: ANESTHESIOLOGY

## 2017-09-22 PROCEDURE — 74178 CT ABD&PLV WO CNTR FLWD CNTR: CPT

## 2017-09-22 PROCEDURE — 85384 FIBRINOGEN ACTIVITY: CPT | Performed by: STUDENT IN AN ORGANIZED HEALTH CARE EDUCATION/TRAINING PROGRAM

## 2017-09-22 RX ORDER — POLYETHYLENE GLYCOL 3350 17 G/17G
17 POWDER, FOR SOLUTION ORAL DAILY PRN
Status: DISCONTINUED | OUTPATIENT
Start: 2017-09-22 | End: 2017-09-23 | Stop reason: HOSPADM

## 2017-09-22 RX ORDER — ACETAMINOPHEN 325 MG/1
650 TABLET ORAL EVERY 4 HOURS
Status: DISCONTINUED | OUTPATIENT
Start: 2017-09-22 | End: 2017-09-23 | Stop reason: HOSPADM

## 2017-09-22 RX ORDER — OXYCODONE HYDROCHLORIDE 5 MG/1
5-10 TABLET ORAL
Status: DISCONTINUED | OUTPATIENT
Start: 2017-09-22 | End: 2017-09-23 | Stop reason: HOSPADM

## 2017-09-22 RX ORDER — SIMETHICONE 80 MG
80 TABLET,CHEWABLE ORAL EVERY 6 HOURS PRN
Status: DISCONTINUED | OUTPATIENT
Start: 2017-09-22 | End: 2017-09-23 | Stop reason: HOSPADM

## 2017-09-22 RX ORDER — AMOXICILLIN 250 MG
1 CAPSULE ORAL AT BEDTIME
Status: DISCONTINUED | OUTPATIENT
Start: 2017-09-22 | End: 2017-09-23 | Stop reason: HOSPADM

## 2017-09-22 RX ORDER — GADOBUTROL 604.72 MG/ML
7.5 INJECTION INTRAVENOUS ONCE
Status: COMPLETED | OUTPATIENT
Start: 2017-09-22 | End: 2017-09-22

## 2017-09-22 RX ORDER — IOPAMIDOL 755 MG/ML
100 INJECTION, SOLUTION INTRAVASCULAR ONCE
Status: COMPLETED | OUTPATIENT
Start: 2017-09-22 | End: 2017-09-22

## 2017-09-22 RX ADMIN — ACETAMINOPHEN 650 MG: 325 TABLET, FILM COATED ORAL at 18:53

## 2017-09-22 RX ADMIN — GADOBUTROL 5.5 ML: 604.72 INJECTION INTRAVENOUS at 15:18

## 2017-09-22 RX ADMIN — SODIUM CHLORIDE, POTASSIUM CHLORIDE, SODIUM LACTATE AND CALCIUM CHLORIDE: 600; 310; 30; 20 INJECTION, SOLUTION INTRAVENOUS at 05:05

## 2017-09-22 RX ADMIN — SODIUM CHLORIDE 55 ML: 9 INJECTION, SOLUTION INTRAVENOUS at 19:55

## 2017-09-22 RX ADMIN — IBUPROFEN 600 MG: 600 TABLET ORAL at 08:19

## 2017-09-22 RX ADMIN — SERTRALINE HYDROCHLORIDE 75 MG: 50 TABLET ORAL at 16:27

## 2017-09-22 RX ADMIN — OXYCODONE HYDROCHLORIDE 5 MG: 5 TABLET ORAL at 09:25

## 2017-09-22 RX ADMIN — POLYETHYLENE GLYCOL 3350 17 G: 17 POWDER, FOR SOLUTION ORAL at 20:22

## 2017-09-22 RX ADMIN — SIMETHICONE CHEW TAB 80 MG 80 MG: 80 TABLET ORAL at 18:54

## 2017-09-22 RX ADMIN — ACETAMINOPHEN 650 MG: 325 TABLET, FILM COATED ORAL at 13:27

## 2017-09-22 RX ADMIN — IOPAMIDOL 59 ML: 755 INJECTION, SOLUTION INTRAVENOUS at 19:55

## 2017-09-22 RX ADMIN — OXYCODONE HYDROCHLORIDE 5 MG: 5 TABLET ORAL at 21:01

## 2017-09-22 RX ADMIN — ACETAMINOPHEN 650 MG: 325 TABLET, FILM COATED ORAL at 05:06

## 2017-09-22 RX ADMIN — OXYCODONE HYDROCHLORIDE 5 MG: 5 TABLET ORAL at 16:27

## 2017-09-22 RX ADMIN — OXYCODONE HYDROCHLORIDE 5 MG: 5 TABLET ORAL at 13:27

## 2017-09-22 RX ADMIN — HYDROCODONE BITARTRATE AND ACETAMINOPHEN 2 TABLET: 5; 325 TABLET ORAL at 02:03

## 2017-09-22 ASSESSMENT — ACTIVITIES OF DAILY LIVING (ADL)
DRESS: 0-->INDEPENDENT
TRANSFERRING: 0-->INDEPENDENT
TOILETING: 0-->INDEPENDENT
RETIRED_EATING: 0-->INDEPENDENT
AMBULATION: 0-->INDEPENDENT
SWALLOWING: 0-->SWALLOWS FOODS/LIQUIDS WITHOUT DIFFICULTY
BATHING: 0-->INDEPENDENT
COGNITION: 0 - NO COGNITION ISSUES REPORTED
FALL_HISTORY_WITHIN_LAST_SIX_MONTHS: NO
RETIRED_COMMUNICATION: 0-->UNDERSTANDS/COMMUNICATES WITHOUT DIFFICULTY

## 2017-09-22 ASSESSMENT — PAIN DESCRIPTION - DESCRIPTORS
DESCRIPTORS: CRAMPING
DESCRIPTORS: CRAMPING

## 2017-09-22 NOTE — PLAN OF CARE
Problem: Patient Care Overview  Goal: Plan of Care/Patient Progress Review  Patient has ambulated to the bathroom with standby assist and tolerated that well. She briefly felt lightheaded when she first dangled at bedside this morning, but that quickly resolved without intervention.  She was able to stand and walk without any further symptoms.   Her de catheter was discontinued,  and IV has been saline locked. She has been able to void spontaneously.   Spouse is here, now, and MD has been paged to make aware of that (per plan).  MR ordered, and IR department has indicated a plan to do this procedure at approx 5 pm.  Patient and spouse are aware.       She was using vicodin and Acetaminophen for pain control, but she exceeded the 4 Gm Acetaminophen limit for 24 hours.  MD here and aware, and pain med has been changed to Oxycodone PRN, and scheduled Acetaminophen.  She is also using Ibuprofen, and she says that it was helpful this morning.        1400:  Patient has been up to bathroom to void without difficulty.  She reported that she was feeling dizzy while lying in bed.  BP 99/60.  AP 64.  Reports she sometimes feels like this when she is not taking her Zoloft 75 mg., which she is on at home.  She thinks her last dose was Tuesday (today is Friday). Text message sent to one of the MDs rounding this morning. Have not heard back yet, will try second number.    1500:  To IR for MRI via w/c, accompanied by spouse.  Checklist completed, and sent with patient.

## 2017-09-22 NOTE — PROGRESS NOTES
"Gynecology Progress Note     S: Miranda reports that she did well overnight. Her pain is under adequate control with Norco 5-325 and she has been taking 2 tablets every 4 hours. She states her pain is a little improved from yesterday, although she still has discomfort that is worse when switching positions. She was able to get up and walk the hallways yesterday without dizziness. She has a good appetite and denies nausea or vomiting. Miranda is able to get up to use the bathroom and reports that she passed a 2 clots vaginally, however she has not had any further gilles bleeding.    /66 (BP Location: Right arm)  Pulse 81  Temp 98.7  F (37.1  C) (Oral)  Resp 16  Ht 1.676 m (5' 6\")  Wt 55.3 kg (122 lb)  SpO2 99%  BMI 19.69 kg/m2    Gen: Patient appears tired but is alert, oriented, NAD  CV: Well perfused  Resp: Good effort without distress   Abdomen: Soft, fundal tenderness, non distended  Lower extremities: Non-tender, no edema  Skin: No lesions or rashes    Labs:   Hgb: 8.1    A/P: Miranda Anderson is a 27 year old POD#2 s/p D&E at 21w5d for trisomy 13 complicated by high cervicouterine laceration with hemorrhage requiring UAE and ultimately 4 u PRBC and 2 units FFP. Initially there was concern for uterine artery compromise during IR procedure, however with continued clinical improvement this seems unlikely. Plan will be to have her continue walking and advancing activity as tolerated. Lai may be removed now that she is mobile. MRI pelvis w/w/o contrast per IR recommendations.  -FEN: Continue maintenance IV fluids. ADAT.  -Pain: Continue with current plan for pain control. Will monitor pain closely.  -GI: Zofran prn  -: Continue to monitor vaginal bleeding. Improved  -Ppx: SCDs    Zeus Lewis MD  Obstetrics & Gynecology, PGY1  September 22, 2017, 6:20 AM    I personally examined and evaluated Miranda Anderson on 9/22/2017.  I discussed the patient with Dr. Lewis and agree with the assessment and plan of " care as documented in the above note with edits by me. Additional comments: 27 year old POD#2 uterine artery embolization for high cervicouterine laceration during 21 week D&E for trisomy 13. Patient appears well this morning. She complain of ongoing LLQ pain, which is well-controlled with oral meds. She is tolerating a regular diet. She ambulated this morning without dizziness or lightheadedness. She is having minimal vaginal bleeding. Her abdomen is soft, no guarding, mildly tender to palpation in the LLQ and in the suprapubic region.   Assessment/Plan: 27 year old POD#2 after UAE for high cervical laceration sustained during D&E, now s/p total 4 u PRBC and 2 u FFP, appearing quite clinically stable, and no longer symptomatic from her acute blood loss anemia. Hgb 8.1 this morning. There was some concern from Dr. Renee of IR of for ischemia caused by the glue necessary to complete the procedure, and a baseline MRI was recommended prior to discharge, so we will obtain this today. Plan for discharge to home possibly later today (s[place]chez lives 500 miles away, but family has hotel in [place] where she would stay tonight), vs tomorrow morning.    Molly Hsu MD

## 2017-09-22 NOTE — PROGRESS NOTES
"SPIRITUAL HEALTH SERVICES  SPIRITUAL ASSESSMENT Progress Note  [facility] ([facility]) 10A     REFERRAL SOURCE:  Initiated    I visited Miranda and her  Anupam to talk about their loss and how they were feeling spiritually today.  They both expressed gratitude for my visiting \"really appreciating the support.\" Miranda and Anupam expressed that in the past 12 hours they have been recently dealing with \"family members who have not been supportive\" which has made \"things even more difficult than they already are.\" We discussed their experiences with this and explained that they were sad \"that their two year old daughter was going home today\" because \"it was the longest Miranda has been away from her daughter.\" Miranda, Anupam and I reflected upon the ways they support each other in their relationship and talked about what that might look like for the future in their recovery and healing. We ended the visit with a prayer which brought Miranda to tears.     PLAN: Spiritual Health will remain a resource for both the patient and family upon request    Argenis Earl  Chaplain Resident  Pager [phone]    "

## 2017-09-22 NOTE — PLAN OF CARE
Problem: Patient Care Overview  Goal: Plan of Care/Patient Progress Review  Outcome: Improving  Alert and oriented x4. VSS. CMS intact. Pt c/o abdominal cramping and right leg pain. PCDs on. Pain being well controlled with oral Norco, tylenol, and ibuprofen. Lai patent, 1600 out this shift. Pt changed perineal pad x1 this shift, no blood clots present, mild drainage. SBA. Up to bathroom x1 this shift. LR running @ 100/hr. Call light within reach, will continue to monitor.

## 2017-09-23 VITALS
SYSTOLIC BLOOD PRESSURE: 100 MMHG | RESPIRATION RATE: 16 BRPM | BODY MASS INDEX: 19.61 KG/M2 | DIASTOLIC BLOOD PRESSURE: 62 MMHG | HEIGHT: 66 IN | WEIGHT: 122 LBS | HEART RATE: 71 BPM | TEMPERATURE: 98.4 F | OXYGEN SATURATION: 98 %

## 2017-09-23 LAB
HGB BLD-MCNC: 8.6 G/DL (ref 11.7–15.7)
PLATELET # BLD AUTO: 133 10E9/L (ref 150–450)

## 2017-09-23 PROCEDURE — 85018 HEMOGLOBIN: CPT | Performed by: OBSTETRICS & GYNECOLOGY

## 2017-09-23 PROCEDURE — 25000132 ZZH RX MED GY IP 250 OP 250 PS 637: Performed by: OBSTETRICS & GYNECOLOGY

## 2017-09-23 PROCEDURE — 25000128 H RX IP 250 OP 636: Performed by: OBSTETRICS & GYNECOLOGY

## 2017-09-23 PROCEDURE — 90686 IIV4 VACC NO PRSV 0.5 ML IM: CPT | Performed by: OBSTETRICS & GYNECOLOGY

## 2017-09-23 PROCEDURE — 36415 COLL VENOUS BLD VENIPUNCTURE: CPT | Performed by: OBSTETRICS & GYNECOLOGY

## 2017-09-23 PROCEDURE — 85049 AUTOMATED PLATELET COUNT: CPT | Performed by: OBSTETRICS & GYNECOLOGY

## 2017-09-23 PROCEDURE — 25000132 ZZH RX MED GY IP 250 OP 250 PS 637: Performed by: STUDENT IN AN ORGANIZED HEALTH CARE EDUCATION/TRAINING PROGRAM

## 2017-09-23 RX ORDER — OXYCODONE AND ACETAMINOPHEN 5; 325 MG/1; MG/1
1-2 TABLET ORAL EVERY 4 HOURS PRN
Qty: 25 TABLET | Refills: 0 | Status: SHIPPED | OUTPATIENT
Start: 2017-09-23

## 2017-09-23 RX ORDER — IBUPROFEN 600 MG/1
600 TABLET, FILM COATED ORAL EVERY 6 HOURS PRN
Qty: 40 TABLET | Refills: 0 | Status: SHIPPED | OUTPATIENT
Start: 2017-09-23

## 2017-09-23 RX ORDER — AMOXICILLIN 250 MG
1 CAPSULE ORAL 2 TIMES DAILY
Qty: 60 TABLET | Refills: 1 | Status: SHIPPED | OUTPATIENT
Start: 2017-09-23

## 2017-09-23 RX ADMIN — INFLUENZA A VIRUS A/MICHIGAN/45/2015 X-275 (H1N1) ANTIGEN (FORMALDEHYDE INACTIVATED), INFLUENZA A VIRUS A/HONG KONG/4801/2014 X-263B (H3N2) ANTIGEN (FORMALDEHYDE INACTIVATED), INFLUENZA B VIRUS B/PHUKET/3073/2013 ANTIGEN (FORMALDEHYDE INACTIVATED), AND INFLUENZA B VIRUS B/BRISBANE/60/2008 ANTIGEN (FORMALDEHYDE INACTIVATED) 0.5 ML: 15; 15; 15; 15 INJECTION, SUSPENSION INTRAMUSCULAR at 11:59

## 2017-09-23 RX ADMIN — POLYETHYLENE GLYCOL 3350 17 G: 17 POWDER, FOR SOLUTION ORAL at 10:38

## 2017-09-23 RX ADMIN — SERTRALINE HYDROCHLORIDE 75 MG: 50 TABLET ORAL at 08:39

## 2017-09-23 RX ADMIN — ACETAMINOPHEN 650 MG: 325 TABLET, FILM COATED ORAL at 10:38

## 2017-09-23 RX ADMIN — ACETAMINOPHEN 650 MG: 325 TABLET, FILM COATED ORAL at 02:40

## 2017-09-23 RX ADMIN — OXYCODONE HYDROCHLORIDE 10 MG: 5 TABLET ORAL at 02:40

## 2017-09-23 RX ADMIN — OXYCODONE HYDROCHLORIDE 5 MG: 5 TABLET ORAL at 06:35

## 2017-09-23 RX ADMIN — ACETAMINOPHEN 650 MG: 325 TABLET, FILM COATED ORAL at 06:35

## 2017-09-23 RX ADMIN — OXYCODONE HYDROCHLORIDE 5 MG: 5 TABLET ORAL at 10:38

## 2017-09-23 NOTE — PLAN OF CARE
Problem: Patient Care Overview  Goal: Goal Outcome Summary  Outcome: No Change  Up ad mushtaq in room.Voiding spontaneously.Denies vaginal bleeding.Denies any dizziness/lightheadedness.Does report pain on lower  Abdominal area,controlled with sched tylenol and oxycodone.Passing gas,had BM last night.Tolerating oral fluids.PIV line saline locked.Possibly d/c home to [place] today.

## 2017-09-23 NOTE — PLAN OF CARE
Problem: Patient Care Overview  Goal: Goal Outcome Summary  Outcome: Adequate for Discharge Date Met:  09/23/17  Alert and oriented. VSS. Abdominal pain managed with prn oxycodone (see eMAR). Denies dizziness nor lightheadedness. Incision dressing CDI. Minimal vaginal spotting per pt. Tolerating regular diet and fluids with no N/V. Voiding spontaneously without difficulty. Bowel sounds active, last BM last evening. Up independently in room.    Pt discharged at 1210. Pt, with  present, received discharge instructions and verbalized understanding and stated no further questions. Pt received discharge medications. PIV removed prior to discharge. Pt escorted by wheelchair to hospital entrance, pt's  driving pt home from hospital.

## 2017-09-23 NOTE — PLAN OF CARE
Problem: Patient Care Overview  Goal: Goal Outcome Summary  Outcome: No Change  Max temp 99. Afebrile. VSS.   Pelvis MRI and CT scan done.   CT scan - result in pending.  Ambulated hallway with . No postural dizziness/LH.   Good appetite with regular diet. No n/v or reflux.   C/o abdominal bloating and constipation. Miralax given with good result- had medium formed BM. Voiding spontaneously  Left PIV discontinued due to discomfort and pain.  Continuous bleeding from IV insertion site, took 15mins to stop bleeding with pressure dressing.  MD notified of IV site bleeding.  Lab drawn  for  INR, PTT  Fibrinogen  - within normal range.  Hgb 8.1,  Scant vaginal bleeding. Rt groin incision CDI.  Pain controlled with Tylenol and oxycodone 5mg .  Will continue to monitor

## 2017-09-23 NOTE — PROGRESS NOTES
"SPIRITUAL HEALTH SERVICES  SPIRITUAL ASSESSMENT Progress Note  [facility] ([facility] [place] [place]) 8A   ON-CALL VISIT    REFERRAL SOURCE: Hospital  request     On-call  made a visit per request on Saturday Sept 23.  Pt and  were both present.  They stated, \"We have just received good news, we are going to get to go home today.\"  Both pt and  were smiling and stated they are excited to go home and see their daughter.  They stated the staff here has been very supportive and they had a very positive experience.  I wished them well on their journey home today.    PLAN: Chaplains to continue to be available as requested while pt and family are in hospital.    Juanita Branch  Chaplain Resident  Pager [phone]  "

## (undated) RX ORDER — CEFAZOLIN SODIUM 1 G/3ML
INJECTION, POWDER, FOR SOLUTION INTRAMUSCULAR; INTRAVENOUS
Status: DISPENSED
Start: 2017-09-20

## (undated) RX ORDER — LIDOCAINE HYDROCHLORIDE 10 MG/ML
INJECTION, SOLUTION EPIDURAL; INFILTRATION; INTRACAUDAL; PERINEURAL
Status: DISPENSED
Start: 2017-09-20

## (undated) RX ORDER — FENTANYL CITRATE 50 UG/ML
INJECTION, SOLUTION INTRAMUSCULAR; INTRAVENOUS
Status: DISPENSED
Start: 2017-09-20

## (undated) RX ORDER — PHENYLEPHRINE HCL IN 0.9% NACL 1 MG/10 ML
SYRINGE (ML) INTRAVENOUS
Status: DISPENSED
Start: 2017-09-20

## (undated) RX ORDER — LIDOCAINE HYDROCHLORIDE 20 MG/ML
INJECTION, SOLUTION EPIDURAL; INFILTRATION; INTRACAUDAL; PERINEURAL
Status: DISPENSED
Start: 2017-09-20

## (undated) RX ORDER — ROCURONIUM BROMIDE 50 MG/5 ML
SYRINGE (ML) INTRAVENOUS
Status: DISPENSED
Start: 2017-09-20

## (undated) RX ORDER — PROPOFOL 10 MG/ML
INJECTION, EMULSION INTRAVENOUS
Status: DISPENSED
Start: 2017-09-20

## (undated) RX ORDER — HEPARIN SODIUM 1000 [USP'U]/ML
INJECTION, SOLUTION INTRAVENOUS; SUBCUTANEOUS
Status: DISPENSED
Start: 2017-09-20

## (undated) RX ORDER — EPHEDRINE SULFATE 50 MG/ML
INJECTION, SOLUTION INTRAMUSCULAR; INTRAVENOUS; SUBCUTANEOUS
Status: DISPENSED
Start: 2017-09-20